# Patient Record
Sex: MALE | Race: WHITE | NOT HISPANIC OR LATINO | Employment: FULL TIME | URBAN - METROPOLITAN AREA
[De-identification: names, ages, dates, MRNs, and addresses within clinical notes are randomized per-mention and may not be internally consistent; named-entity substitution may affect disease eponyms.]

---

## 2023-05-02 ENCOUNTER — HOSPITAL ENCOUNTER (OUTPATIENT)
Dept: DATA CONVERSION | Facility: HOSPITAL | Age: 68
End: 2023-05-02
Attending: INTERNAL MEDICINE
Payer: MEDICARE

## 2023-05-02 DIAGNOSIS — K31.7 POLYP OF STOMACH AND DUODENUM: ICD-10-CM

## 2023-05-02 DIAGNOSIS — H42 GLAUCOMA IN DISEASES CLASSIFIED ELSEWHERE: ICD-10-CM

## 2023-05-02 DIAGNOSIS — R59.9 ENLARGED LYMPH NODES, UNSPECIFIED: ICD-10-CM

## 2023-05-02 DIAGNOSIS — G47.33 OBSTRUCTIVE SLEEP APNEA (ADULT) (PEDIATRIC): ICD-10-CM

## 2023-05-02 DIAGNOSIS — H40.9 UNSPECIFIED GLAUCOMA: ICD-10-CM

## 2023-05-02 DIAGNOSIS — E78.00 PURE HYPERCHOLESTEROLEMIA, UNSPECIFIED: ICD-10-CM

## 2023-05-02 DIAGNOSIS — K58.9 IRRITABLE BOWEL SYNDROME WITHOUT DIARRHEA: ICD-10-CM

## 2023-05-02 DIAGNOSIS — E11.39 TYPE 2 DIABETES MELLITUS WITH OTHER DIABETIC OPHTHALMIC COMPLICATION (MULTI): ICD-10-CM

## 2023-05-02 DIAGNOSIS — I10 ESSENTIAL (PRIMARY) HYPERTENSION: ICD-10-CM

## 2023-05-02 DIAGNOSIS — K21.9 GASTRO-ESOPHAGEAL REFLUX DISEASE WITHOUT ESOPHAGITIS: ICD-10-CM

## 2023-05-02 DIAGNOSIS — E66.9 OBESITY, UNSPECIFIED: ICD-10-CM

## 2023-05-02 LAB
POCT GLUCOSE: 115 MG/DL (ref 74–99)
POCT GLUCOSE: 118 MG/DL (ref 74–99)

## 2023-05-10 LAB
COMPLETE PATHOLOGY REPORT: NORMAL
CONVERTED CLINICAL DIAGNOSIS-HISTORY: NORMAL
CONVERTED FINAL DIAGNOSIS: NORMAL
CONVERTED FINAL REPORT PDF LINK TO COPY AND PASTE: NORMAL
CONVERTED GROSS DESCRIPTION: NORMAL

## 2024-03-05 ENCOUNTER — OFFICE VISIT (OUTPATIENT)
Dept: ORTHOPEDIC SURGERY | Facility: CLINIC | Age: 69
End: 2024-03-05
Payer: MEDICARE

## 2024-03-05 ENCOUNTER — HOSPITAL ENCOUNTER (OUTPATIENT)
Dept: RADIOLOGY | Facility: CLINIC | Age: 69
Discharge: HOME | End: 2024-03-05
Payer: MEDICARE

## 2024-03-05 DIAGNOSIS — M54.16 LUMBAR RADICULITIS: ICD-10-CM

## 2024-03-05 DIAGNOSIS — M54.16 LUMBAR RADICULITIS: Primary | ICD-10-CM

## 2024-03-05 PROCEDURE — 3008F BODY MASS INDEX DOCD: CPT | Performed by: STUDENT IN AN ORGANIZED HEALTH CARE EDUCATION/TRAINING PROGRAM

## 2024-03-05 PROCEDURE — 72120 X-RAY BEND ONLY L-S SPINE: CPT

## 2024-03-05 PROCEDURE — 99204 OFFICE O/P NEW MOD 45 MIN: CPT | Performed by: STUDENT IN AN ORGANIZED HEALTH CARE EDUCATION/TRAINING PROGRAM

## 2024-03-05 PROCEDURE — 72110 X-RAY EXAM L-2 SPINE 4/>VWS: CPT | Performed by: RADIOLOGY

## 2024-03-05 RX ORDER — TELMISARTAN 80 MG/1
80 TABLET ORAL
COMMUNITY
Start: 2024-01-03

## 2024-03-05 RX ORDER — MOMETASONE FUROATE 50 UG/1
SPRAY, METERED NASAL
COMMUNITY

## 2024-03-05 RX ORDER — GABAPENTIN 100 MG/1
100 CAPSULE ORAL NIGHTLY
Qty: 30 CAPSULE | Refills: 2 | Status: SHIPPED | OUTPATIENT
Start: 2024-03-05 | End: 2024-06-03

## 2024-03-05 RX ORDER — LANSOPRAZOLE 30 MG/1
30 CAPSULE, DELAYED RELEASE ORAL
COMMUNITY
Start: 2012-03-05 | End: 2024-07-01

## 2024-03-05 RX ORDER — INSULIN DETEMIR 100 [IU]/ML
10 INJECTION, SOLUTION SUBCUTANEOUS
COMMUNITY
Start: 2024-02-01 | End: 2024-05-01

## 2024-03-05 RX ORDER — TAMSULOSIN HYDROCHLORIDE 0.4 MG/1
0.4 CAPSULE ORAL 2 TIMES DAILY
COMMUNITY
Start: 2012-12-11

## 2024-03-05 RX ORDER — IBUPROFEN 200 MG
1 CAPSULE ORAL 3 TIMES DAILY
COMMUNITY
Start: 2023-07-03

## 2024-03-05 RX ORDER — ATORVASTATIN CALCIUM 10 MG/1
10 TABLET, FILM COATED ORAL
COMMUNITY
Start: 2024-01-03

## 2024-03-05 RX ORDER — FAMOTIDINE 20 MG/1
20 TABLET, FILM COATED ORAL 2 TIMES DAILY
COMMUNITY
Start: 2023-07-03

## 2024-03-05 RX ORDER — ERGOCALCIFEROL 1.25 MG/1
50000 CAPSULE ORAL WEEKLY
COMMUNITY
Start: 2024-01-09 | End: 2024-04-02

## 2024-03-05 RX ORDER — DICYCLOMINE HYDROCHLORIDE 20 MG/1
TABLET ORAL
COMMUNITY
Start: 2020-02-24

## 2024-03-05 RX ORDER — AZELASTINE 1 MG/ML
1 SPRAY, METERED NASAL 2 TIMES DAILY
COMMUNITY
Start: 2021-07-20

## 2024-03-05 RX ORDER — METHOCARBAMOL 500 MG/1
500 TABLET, FILM COATED ORAL EVERY 12 HOURS PRN
COMMUNITY
Start: 2024-02-08

## 2024-03-05 RX ORDER — NATEGLINIDE 120 MG/1
1 TABLET ORAL
COMMUNITY
Start: 2023-04-03

## 2024-03-05 RX ORDER — METOPROLOL SUCCINATE 100 MG/1
TABLET, EXTENDED RELEASE ORAL
COMMUNITY
Start: 2023-08-28

## 2024-03-05 RX ORDER — CALCIUM CITRATE/VITAMIN D3 200MG-6.25
TABLET ORAL
COMMUNITY

## 2024-03-05 RX ORDER — KETOCONAZOLE 20 MG/ML
SHAMPOO, SUSPENSION TOPICAL
COMMUNITY
Start: 2023-02-21

## 2024-03-05 SDOH — SOCIAL STABILITY: SOCIAL NETWORK: SOCIAL ACTIVITY:: 1

## 2024-03-05 NOTE — PROGRESS NOTES
Assessment     Cy is a 68 y.o. male with significant past medical history of KD (stage 3, last Cr 1.38), HTN, HLD, DM2 (last A1C 6.1 12/27/23), IBS/GERD, PAT, Glaucoma who presents with right leg and low back pain .  The patient's symptoms, clinical exam and imaging studies are suggestive of L5-S1 radiculopathy.  The other possible differential diagnosis(es) include(s):  disc herniation and spondylosis without myelopathy.      Plan     At this time, I would like to make the following recommendation/plan:  1.  Physical Therapy: referral placed with PT  2.  Medication: cont. With tylenol prn. Start gabapentin 100mg nightly (low dose 2/2 CKD)  3.  Injections: Not indicated at this time given low severity of pain; may consider if symptoms worsen post MRI completion  4.  Imaging: Xray of lumbar spine ordered.     Follow up:  Follow up in 6 weeks.    Subjective    Chief Complaint: right low back pain     History of Present Illness:  Cy Faria is a 68 y.o. male, retired, with pertinent PMH of CKD (stage 3, last Cr 1.38), HTN, HLD, DM2 (last A1C 6.1 12/27/23), IBS/GERD, PAT, Glaucoma presents today for low back and right leg to toes.  He pain first started on >10 years acutely worsening over last 6 month , without inciting event. The pain as located in the low back and left leg. Pain comes and goes lasting from minutes to hours)    Pain:        Severity:  Cy Faria states pain is: 4/10 at it's worst. On average pain is 2/10 (Scale 0-no pain, 10-worst pain)      Quality:  aching and throbbing.       Radiating: down the lateral leg       Aggravating Factors: no aggravating factors identified sitting on soft couch        Alleviating Factors:   tylenol  .      Time: sometimes morning      Associated symptoms:  no numbness, or tingling in the bilateral lower extremities; no balance problems or new bowel/bladder problems.    Physical Therapy/Occupational Therapy/Other Modalities:    - Chiropractor/OMT: No  -  Acupuncture: No  - Medical message: No    Last PT session: >2 years     Topicals:   ICE/Heat: hasn't tried   Topicals (Capsicin/Diclofenac gel/Salonpas/Lidocaine): voltaren helps a little    Medications:  Tylenol: once a day with mild relief  NSAIDs: can't taken because of CKD  Steroid: causes delirium   Gabapentin/Lyrica: Hasn't tried  Muscle relaxer: Has tried with robaxin without benefit  Duloxetine/Topamax/oxcarbazepine: hasn't tried        Current Outpatient Medications:     atorvastatin (Lipitor) 10 mg tablet, Take 1 tablet (10 mg) by mouth once daily., Disp: , Rfl:     azelastine (Astelin) 137 mcg (0.1 %) nasal spray, 1 spray by Does not apply route twice a day., Disp: , Rfl:     blood sugar diagnostic (Blood Glucose Test) strip, 1 strip 3 times a day., Disp: , Rfl:     dicyclomine (Bentyl) 20 mg tablet, Take 1/2 to 1 up to tid prn bowel spasms., Disp: , Rfl:     ergocalciferol (Vitamin D-2) 1.25 MG (13551 UT) capsule, Take 1 capsule (50,000 Units) by mouth once a week., Disp: , Rfl:     famotidine (Pepcid) 20 mg tablet, Take 1 tablet (20 mg) by mouth twice a day., Disp: , Rfl:     ketoconazole (NIZOral) 2 % shampoo, Wash affected areas daily in the shower, Disp: , Rfl:     lansoprazole (Prevacid) 30 mg DR capsule, Take 1 capsule (30 mg) by mouth once daily., Disp: , Rfl:     Levemir FlexPen 100 unit/mL (3 mL) pen, Inject 10 Units under the skin., Disp: , Rfl:     methocarbamol (Robaxin) 500 mg tablet, Take 1 tablet (500 mg) by mouth every 12 hours if needed., Disp: , Rfl:     metoprolol succinate XL (Toprol-XL) 100 mg 24 hr tablet, , Disp: , Rfl:     nateglinide (Starlix) 120 mg tablet, Take 1 tablet (120 mg) by mouth 3 times a day before meals., Disp: , Rfl:     tamsulosin (Flomax) 0.4 mg 24 hr capsule, Take 1 capsule (0.4 mg) by mouth twice a day., Disp: , Rfl:     telmisartan (MIcarDIS) 80 mg tablet, Take 1 tablet (80 mg) by mouth once daily., Disp: , Rfl:     liraglutide (Victoza) 0.6 mg/0.1 mL (18 mg/3  mL) injection, Inject 0.1 mL (0.6 mg) under the skin once daily., Disp: , Rfl:     mometasone (Nasonex) 50 mcg/actuation nasal spray, Use 2 Sprays in each nostril once daily. For patients (12yrs & older), Disp: , Rfl:     True Metrix Glucose Test Strip strip, 1 Strip three times daily. Use to test sugar 3 times daily., Disp: , Rfl:     Allergies   Allergen Reactions    Celebrex [Celecoxib] Shortness of breath       Injections:    Date/Injection Type/Location/%relief/ Lasting  No Hx  Surgery:  Date/Type/Location/%relief/ Lasting    Past Surgical History:   Procedure Laterality Date    OTHER SURGICAL HISTORY  11/10/2020    Gallbladder surgery       Past Medical History:   Diagnosis Date    Personal history of other diseases of the nervous system and sense organs     History of glaucoma         ROS:  - Sleep: Sleep isn't disrupted secondary to pain  - Bowel/Bladder: Denies new bowel/bladder dysfunction  - Falls: Denies fall  - Weight changes: Denies  - Mood/Psych: Denies any feelings of anxiety or depression    12-point review of systems was completed and is otherwise negative except as noted in the HPI.      Social Hx:  - Home: Lives alone in a house.   - ADLs: Independent in ADLs and ambulation without assistive device.   - Hobbies: golfing, walking   - Work:  retired    - Smoking/Alcohol/Drugs: No/No/No  Objective     Physical Exam:  General:  Appears state age, in NAD, and well nourished  Psychological:  Normal mood and affect  Pulm:  Breathing comfortably on RA    Gait:   - Antalgic  - Without assistive device  - able to heel raise, able to toe walk    Inspection:   - No erythema, swelling/edema, ecchymosis or deformity noted  - normal muscle bulk of bilateral lower extremity     Sensation:  - Intact to light touch in bilateral lower extremity    Palpation:  - No tenderness to palpation of bilateral greater trochanter  - No tenderness to palpation of bilateral sacroiliac joint  - No tenderness to  palpation of bilateral spinal paraspinals at the level of L4-L5  - No tenderness to palpation of spinous process at the level of L4-L5    Range of Motion:  - limited mildly painful thoracolumbar flexion/extension/rotation/sidebending  - limited painless bilateral hip flexion/internal rotation/external rotation (very tight hamstring)    Strength:  Side Hip Flexion (L2) Knee Extension (L3) Ankle Dorsiflexion (L4) Great Toe extension (L5) Ankle Plantarflexion  (S1) Hip Adduction  (L2-L4) Hip Abduction  (L5-S1)   Right 5 5 NT NT NT NT NT   Left 5 5 Nt Nt Nt Nt NT     Reflexes:  Side Patellar (L4) Achilles (S1)  Medial hamstring(L5)   Right 2+ 2+ NT   Left 2+ 2+ NT     Special tests:  - Disc/Nerve root: (SLR): neg b/l  - Facet loading: neg b/l    Imaging and Other Studies:    No results found.

## 2024-03-13 ENCOUNTER — APPOINTMENT (OUTPATIENT)
Dept: GASTROENTEROLOGY | Facility: CLINIC | Age: 69
End: 2024-03-13
Payer: MEDICARE

## 2024-03-21 ENCOUNTER — APPOINTMENT (OUTPATIENT)
Dept: GASTROENTEROLOGY | Facility: CLINIC | Age: 69
End: 2024-03-21
Payer: MEDICARE

## 2024-04-16 ENCOUNTER — OFFICE VISIT (OUTPATIENT)
Dept: ORTHOPEDIC SURGERY | Facility: CLINIC | Age: 69
End: 2024-04-16
Payer: MEDICARE

## 2024-04-16 DIAGNOSIS — M54.16 LUMBAR RADICULITIS: Primary | ICD-10-CM

## 2024-04-16 PROCEDURE — 99213 OFFICE O/P EST LOW 20 MIN: CPT | Performed by: STUDENT IN AN ORGANIZED HEALTH CARE EDUCATION/TRAINING PROGRAM

## 2024-04-16 PROCEDURE — 3008F BODY MASS INDEX DOCD: CPT | Performed by: STUDENT IN AN ORGANIZED HEALTH CARE EDUCATION/TRAINING PROGRAM

## 2024-04-16 PROCEDURE — 1125F AMNT PAIN NOTED PAIN PRSNT: CPT | Performed by: STUDENT IN AN ORGANIZED HEALTH CARE EDUCATION/TRAINING PROGRAM

## 2024-04-16 SDOH — SOCIAL STABILITY: SOCIAL NETWORK: SOCIAL ACTIVITY:: 1

## 2024-04-16 ASSESSMENT — PAIN - FUNCTIONAL ASSESSMENT: PAIN_FUNCTIONAL_ASSESSMENT: 0-10

## 2024-04-16 ASSESSMENT — PAIN DESCRIPTION - DESCRIPTORS: DESCRIPTORS: ACHING

## 2024-04-16 ASSESSMENT — PAIN SCALES - GENERAL: PAINLEVEL_OUTOF10: 2

## 2024-04-16 NOTE — PROGRESS NOTES
Assessment     Cy is a 68 y.o. male with significant past medical history of CKD (stage 3, last Cr 1.38), HTN, HLD, DM2 (last A1C 6.1 12/27/23), IBS/GERD, PAT, Glaucoma who presents with right leg and low back pain .  The patient's symptoms, clinical exam and imaging studies are suggestive of L5-S1 radiculopathy.  The other possible differential diagnosis(es) include(s):  disc herniation and spondylosis without myelopathy.         His symptoms have improved since the last visit with 25% improvement.  Physical Therapy and medication (gabapentin not as prescribed)helped significantly.    Plan     At this time, I would like to make the following recommendation/plan:  1.  Physical Therapy: continue with PT   2.  Medication: : cont. With tylenol prn. C/w gabapentin 100mg nightly (low dose 2/2 CKD)   3.  Injections: .Not indicated at this time given low severity of pain; may consider if symptoms worsen post MRI completion   4.  Imaging: Interpreted:   Xray of lumbar spine from 03/05/24 interpreted  as:  Evidence of retrolisthesis of L5 on S1  Multilevel Facet arthropathy at the level L4-s1    Follow up:  Follow up in 2 months       Subjective    Chief Complaint: right low back pain     HPI:  Cy Faria is a 68 y.o. male, retired, with pertinent PMH of CKD (stage 3, last Cr 1.38), HTN, HLD, DM2 (last A1C 6.1 12/27/23), IBS/GERD, PAT, Glaucoma who is following up today for low back and right leg to toes.  He pain first started on >10 years acutely worsening over last 6 months, without inciting event.  He was last seen on 03/05/24. Plan at the time was PT referral and start gabapentin.  Since his last visit symptoms have improved by 25% since onset. Today, pain is located in the low back and left leg. Pain comes and goes lasting from minutes to hours), described as  aching and throbbing radiating down the lateral leg, 2/10.    Since last visit:  - Physical Therapy: Last PT was yesterday. Patient has completed 4  sessions with moderate benefit.   - Medication: Tried gabapentin 100 mg  per day, tolerated with mild sedative side effect and mild benefit.  - tylenol, voltaren help a little. Steroids cause delirium. Robaxin doesn't help  - Imaging:  xray of lumbar spine was completed    ROS:  - Sleep: Sleep isn't disrupted secondary to pain  - Bowel/Bladder: Denies new bowel/bladder dysfunction  - Falls: Denies fall  - Weight changes: Denies  - Mood/Psych: Denies any feelings of anxiety or depression    12-point review of systems was completed and is otherwise negative except as noted in the HPI.      Social Hx:  - Home: Lives alone in a house.   - ADLs: Independent in ADLs and ambulation without assistive device.   - Hobbies: golfing, walking   - Work:  retired    - Smoking/Alcohol/Drugs: No/No/No    Objective     Physical Exam  General:  Appears state age, in NAD, and well nourished  Psychological:  Normal mood and affect  Pulm:  Breathing comfortably on RA    Gait:   - Antalgic  - Without assistive device  - able to heel raise, able to toe raise     Inspection:   - No erythema, swelling/edema, ecchymosis or deformity noted  - normal muscle bulk of bilateral lower extremity      Sensation:  - Intact to light touch in bilateral lower extremity     Palpation:  - No tenderness to palpation of bilateral greater trochanter  - No tenderness to palpation of bilateral sacroiliac joint  - No tenderness to palpation of bilateral spinal paraspinals at the level of L4-L5  - No tenderness to palpation of spinous process at the level of L4-L5     Range of Motion:  - limited painful thoracolumbar flexion/extension  - limited painless bilateral hip flexion/internal rotation/external rotation (very tight hamstring)     Strength:  Side Hip Flexion (L2) Knee Extension (L3) Ankle Dorsiflexion (L4) Great Toe extension (L5) Ankle Plantarflexion  (S1) Hip Adduction  (L2-L4) Hip Abduction  (L5-S1)   Right 5 5 NT NT NT NT NT   Left 5 5 Nt Nt  Nt Nt NT      Reflexes:  Side Patellar (L4) Achilles (S1)  Medial hamstring(L5)   Right 2+ 2+ NT   Left 2+ 2+ NT      Special tests:  - Disc/Nerve root: (SLR): neg b/l  - Facet loading: neg b/l    Imaging and Other Studies:    XR LUMBAR SPINE 4+ VIEWS WITH FLEXION EXTENSION; ;  3/5/2024 3:39 pm      INDICATION:  Signs/Symptoms:chronic low back pain.      COMPARISON:  None.      ACCESSION NUMBER(S):  [id number]      ORDERING CLINICIAN:  BRANDON QUILES      FINDINGS:  Lumbar spine, four views      There is moderate multilevel disc space narrowing osteophytosis  throughout the lumbar spine worse at L3-L4. Moderate facet disease  lower lumbar spine. There is no fracture. No spondylolisthesis. There  is no change on flexion-extension views      IMPRESSION:  Moderate multilevel spondylosis worse at L3-L4. Moderate facet  disease. No acute abnormality

## 2024-04-23 RX ORDER — SODIUM CHLORIDE, SODIUM LACTATE, POTASSIUM CHLORIDE, CALCIUM CHLORIDE 600; 310; 30; 20 MG/100ML; MG/100ML; MG/100ML; MG/100ML
20 INJECTION, SOLUTION INTRAVENOUS CONTINUOUS
Status: CANCELLED | OUTPATIENT
Start: 2024-04-23

## 2024-04-24 ENCOUNTER — HOSPITAL ENCOUNTER (OUTPATIENT)
Dept: GASTROENTEROLOGY | Facility: HOSPITAL | Age: 69
Discharge: HOME | End: 2024-04-24
Payer: MEDICARE

## 2024-04-24 ENCOUNTER — ANESTHESIA (OUTPATIENT)
Dept: GASTROENTEROLOGY | Facility: HOSPITAL | Age: 69
End: 2024-04-24
Payer: MEDICARE

## 2024-04-24 ENCOUNTER — ANESTHESIA EVENT (OUTPATIENT)
Dept: GASTROENTEROLOGY | Facility: HOSPITAL | Age: 69
End: 2024-04-24
Payer: MEDICARE

## 2024-04-24 VITALS
TEMPERATURE: 97.7 F | WEIGHT: 266.32 LBS | HEIGHT: 68 IN | HEART RATE: 93 BPM | DIASTOLIC BLOOD PRESSURE: 85 MMHG | SYSTOLIC BLOOD PRESSURE: 151 MMHG | RESPIRATION RATE: 16 BRPM | OXYGEN SATURATION: 96 % | BODY MASS INDEX: 40.36 KG/M2

## 2024-04-24 DIAGNOSIS — K31.7 POLYP OF STOMACH AND DUODENUM: ICD-10-CM

## 2024-04-24 PROBLEM — G47.33 OSA (OBSTRUCTIVE SLEEP APNEA): Status: ACTIVE | Noted: 2024-04-24

## 2024-04-24 PROBLEM — E78.5 HYPERLIPIDEMIA: Status: ACTIVE | Noted: 2024-04-24

## 2024-04-24 PROBLEM — E11.9 DIABETES MELLITUS, TYPE 2 (MULTI): Status: ACTIVE | Noted: 2024-04-24

## 2024-04-24 PROBLEM — N18.9 CHRONIC RENAL INSUFFICIENCY: Status: ACTIVE | Noted: 2024-04-24

## 2024-04-24 PROBLEM — I10 HTN (HYPERTENSION): Status: ACTIVE | Noted: 2024-04-24

## 2024-04-24 PROBLEM — K21.9 GASTROESOPHAGEAL REFLUX DISEASE: Status: ACTIVE | Noted: 2024-04-24

## 2024-04-24 PROBLEM — H40.9 GLAUCOMA: Status: ACTIVE | Noted: 2024-04-24

## 2024-04-24 PROBLEM — E66.9 OBESITY: Status: ACTIVE | Noted: 2024-04-24

## 2024-04-24 LAB
GLUCOSE BLD MANUAL STRIP-MCNC: 106 MG/DL (ref 74–99)
GLUCOSE BLD MANUAL STRIP-MCNC: 118 MG/DL (ref 74–99)

## 2024-04-24 PROCEDURE — 88305 TISSUE EXAM BY PATHOLOGIST: CPT | Mod: 59 | Performed by: INTERNAL MEDICINE

## 2024-04-24 PROCEDURE — 2500000005 HC RX 250 GENERAL PHARMACY W/O HCPCS: Performed by: ANESTHESIOLOGIST ASSISTANT

## 2024-04-24 PROCEDURE — 88305 TISSUE EXAM BY PATHOLOGIST: CPT | Performed by: PATHOLOGY

## 2024-04-24 PROCEDURE — 43251 EGD REMOVE LESION SNARE: CPT | Performed by: INTERNAL MEDICINE

## 2024-04-24 PROCEDURE — A43251 PR EDG REMOVAL TUMOR POLYP/OTHER LESION SNARE TECH: Performed by: ANESTHESIOLOGIST ASSISTANT

## 2024-04-24 PROCEDURE — 2720000007 HC OR 272 NO HCPCS

## 2024-04-24 PROCEDURE — 2500000004 HC RX 250 GENERAL PHARMACY W/ HCPCS (ALT 636 FOR OP/ED): Performed by: ANESTHESIOLOGIST ASSISTANT

## 2024-04-24 PROCEDURE — 3700000001 HC GENERAL ANESTHESIA TIME - INITIAL BASE CHARGE

## 2024-04-24 PROCEDURE — 82947 ASSAY GLUCOSE BLOOD QUANT: CPT | Mod: 91

## 2024-04-24 PROCEDURE — 3700000002 HC GENERAL ANESTHESIA TIME - EACH INCREMENTAL 1 MINUTE

## 2024-04-24 PROCEDURE — 7100000010 HC PHASE TWO TIME - EACH INCREMENTAL 1 MINUTE

## 2024-04-24 PROCEDURE — 7100000009 HC PHASE TWO TIME - INITIAL BASE CHARGE

## 2024-04-24 PROCEDURE — A43251 PR EDG REMOVAL TUMOR POLYP/OTHER LESION SNARE TECH: Performed by: ANESTHESIOLOGY

## 2024-04-24 PROCEDURE — 43239 EGD BIOPSY SINGLE/MULTIPLE: CPT | Mod: XS | Performed by: INTERNAL MEDICINE

## 2024-04-24 RX ORDER — SODIUM CHLORIDE, SODIUM LACTATE, POTASSIUM CHLORIDE, CALCIUM CHLORIDE 600; 310; 30; 20 MG/100ML; MG/100ML; MG/100ML; MG/100ML
100 INJECTION, SOLUTION INTRAVENOUS CONTINUOUS
Status: DISCONTINUED | OUTPATIENT
Start: 2024-04-24 | End: 2024-04-25 | Stop reason: HOSPADM

## 2024-04-24 RX ORDER — MIDAZOLAM HYDROCHLORIDE 1 MG/ML
INJECTION INTRAMUSCULAR; INTRAVENOUS AS NEEDED
Status: DISCONTINUED | OUTPATIENT
Start: 2024-04-24 | End: 2024-04-24

## 2024-04-24 RX ORDER — ONDANSETRON HYDROCHLORIDE 2 MG/ML
4 INJECTION, SOLUTION INTRAVENOUS ONCE AS NEEDED
Status: DISCONTINUED | OUTPATIENT
Start: 2024-04-24 | End: 2024-04-25 | Stop reason: HOSPADM

## 2024-04-24 RX ORDER — PROPOFOL 10 MG/ML
INJECTION, EMULSION INTRAVENOUS CONTINUOUS PRN
Status: DISCONTINUED | OUTPATIENT
Start: 2024-04-24 | End: 2024-04-24

## 2024-04-24 RX ORDER — FENTANYL CITRATE 50 UG/ML
INJECTION, SOLUTION INTRAMUSCULAR; INTRAVENOUS CONTINUOUS PRN
Status: DISCONTINUED | OUTPATIENT
Start: 2024-04-24 | End: 2024-04-24

## 2024-04-24 RX ADMIN — GLYCOPYRROLATE 0.2 MCG: 0.2 INJECTION, SOLUTION INTRAMUSCULAR; INTRAVITREAL at 08:36

## 2024-04-24 RX ADMIN — MIDAZOLAM HYDROCHLORIDE 2 MG: 1 INJECTION INTRAMUSCULAR; INTRAVENOUS at 08:36

## 2024-04-24 RX ADMIN — PROPOFOL 400 MCG/KG/MIN: 10 INJECTION, EMULSION INTRAVENOUS at 08:42

## 2024-04-24 SDOH — HEALTH STABILITY: MENTAL HEALTH: CURRENT SMOKER: 0

## 2024-04-24 ASSESSMENT — PAIN - FUNCTIONAL ASSESSMENT
PAIN_FUNCTIONAL_ASSESSMENT: 0-10

## 2024-04-24 ASSESSMENT — PAIN SCALES - GENERAL
PAINLEVEL_OUTOF10: 0 - NO PAIN

## 2024-04-24 ASSESSMENT — COLUMBIA-SUICIDE SEVERITY RATING SCALE - C-SSRS
2. HAVE YOU ACTUALLY HAD ANY THOUGHTS OF KILLING YOURSELF?: NO
6. HAVE YOU EVER DONE ANYTHING, STARTED TO DO ANYTHING, OR PREPARED TO DO ANYTHING TO END YOUR LIFE?: NO
1. IN THE PAST MONTH, HAVE YOU WISHED YOU WERE DEAD OR WISHED YOU COULD GO TO SLEEP AND NOT WAKE UP?: NO

## 2024-04-24 NOTE — ANESTHESIA POSTPROCEDURE EVALUATION
Patient: Cy Faria    Procedure Summary       Date: 04/24/24 Room / Location: [facility]    Anesthesia Start: 0840 Anesthesia Stop: 0903    Procedure: EGD Diagnosis: Polyp of stomach and duodenum    Scheduled Providers: Dayan Lilly MD; Bradly Rodriguez DO; NISA Traore Responsible Provider: Bradly Rodriguez DO    Anesthesia Type: MAC ASA Status: 3            Anesthesia Type: MAC    Vitals Value Taken Time   /83 04/24/24 0931   Temp 36.5 °C (97.7 °F) 04/24/24 0859   Pulse 86 04/24/24 0932   Resp 16 04/24/24 0914   SpO2 96 % 04/24/24 0932   Vitals shown include unfiled device data.    Anesthesia Post Evaluation    Patient location during evaluation: PACU  Patient participation: complete - patient participated  Level of consciousness: awake and alert  Pain management: adequate  Airway patency: patent  Cardiovascular status: acceptable and blood pressure returned to baseline  Respiratory status: acceptable  Hydration status: acceptable  Postoperative Nausea and Vomiting: none        No notable events documented.

## 2024-04-24 NOTE — ANESTHESIA PREPROCEDURE EVALUATION
Patient: Cy Faria    Procedure Information       Date/Time: 04/24/24 0830    Scheduled providers: Dayan Lilly MD; Bradly Rodriguez DO; NISA Traore    Procedure: EGD    Location: [facility]note 3[facility]            Relevant Problems   Anesthesia (within normal limits)      Cardiac   (+) HTN (hypertension)   (+) Hyperlipidemia      Pulmonary   (+) PAT (obstructive sleep apnea)      Neuro (within normal limits)      GI  Gastric polyp   (+) Gastroesophageal reflux disease      /Renal   (+) Chronic renal insufficiency      Liver (within normal limits)      Endocrine   (+) Diabetes mellitus, type 2 (Multi)   (+) Obesity      HEENT   (+) Glaucoma     Vitals:    04/24/24 0734   BP: 169/88   Pulse: 85   Resp: 12   Temp: 36.2 °C (97.2 °F)   SpO2: 95%       Past Surgical History:   Procedure Laterality Date    COLONOSCOPY      CYST REMOVAL Left     OTHER SURGICAL HISTORY  11/10/2020    Gallbladder surgery    TONSILLECTOMY       Past Medical History:   Diagnosis Date    DM (diabetes mellitus) (Multi)     Enlarged prostate     GERD (gastroesophageal reflux disease)     Personal history of other diseases of the nervous system and sense organs     History of glaucoma    Sleep apnea        Current Outpatient Medications:     atorvastatin (Lipitor) 10 mg tablet, Take 1 tablet (10 mg) by mouth once daily., Disp: , Rfl:     azelastine (Astelin) 137 mcg (0.1 %) nasal spray, 1 spray by Does not apply route twice a day., Disp: , Rfl:     dicyclomine (Bentyl) 20 mg tablet, Take 1/2 to 1 up to tid prn bowel spasms., Disp: , Rfl:     famotidine (Pepcid) 20 mg tablet, Take 1 tablet (20 mg) by mouth twice a day., Disp: , Rfl:     gabapentin (Neurontin) 100 mg capsule, Take 1 capsule (100 mg) by mouth once daily at bedtime., Disp: 30 capsule, Rfl: 2    ketoconazole (NIZOral) 2 % shampoo, Wash affected areas daily in the shower, Disp: , Rfl:     lansoprazole (Prevacid) 30 mg DR capsule, Take 1 capsule (30 mg) by mouth  once daily., Disp: , Rfl:     Levemir FlexPen 100 unit/mL (3 mL) pen, Inject 10 Units under the skin., Disp: , Rfl:     liraglutide (Victoza) 0.6 mg/0.1 mL (18 mg/3 mL) injection, Inject 0.1 mL (0.6 mg) under the skin once daily., Disp: , Rfl:     methocarbamol (Robaxin) 500 mg tablet, Take 1 tablet (500 mg) by mouth every 12 hours if needed., Disp: , Rfl:     metoprolol succinate XL (Toprol-XL) 100 mg 24 hr tablet, , Disp: , Rfl:     mometasone (Nasonex) 50 mcg/actuation nasal spray, Use 2 Sprays in each nostril once daily. For patients (12yrs & older), Disp: , Rfl:     nateglinide (Starlix) 120 mg tablet, Take 1 tablet (120 mg) by mouth 3 times a day before meals., Disp: , Rfl:     tamsulosin (Flomax) 0.4 mg 24 hr capsule, Take 1 capsule (0.4 mg) by mouth twice a day., Disp: , Rfl:     telmisartan (MIcarDIS) 80 mg tablet, Take 1 tablet (80 mg) by mouth once daily., Disp: , Rfl:     blood sugar diagnostic (Blood Glucose Test) strip, 1 strip 3 times a day., Disp: , Rfl:     True Metrix Glucose Test Strip strip, 1 Strip three times daily. Use to test sugar 3 times daily., Disp: , Rfl:     Current Facility-Administered Medications:     lactated Ringer's infusion, 100 mL/hr, intravenous, Continuous, Bradly Rodriguez,     ondansetron (Zofran) injection 4 mg, 4 mg, intravenous, Once PRN, Bradly Rodriguez,     oxygen (O2) therapy, , inhalation, Continuous - 02/gases, Bradly Rodriguez, DO  Prior to Admission medications    Medication Sig Start Date End Date Taking? Authorizing Provider   atorvastatin (Lipitor) 10 mg tablet Take 1 tablet (10 mg) by mouth once daily. 1/3/24  Yes Historical Provider, MD   azelastine (Astelin) 137 mcg (0.1 %) nasal spray 1 spray by Does not apply route twice a day. 7/20/21  Yes Historical Provider, MD   dicyclomine (Bentyl) 20 mg tablet Take 1/2 to 1 up to tid prn bowel spasms. 2/24/20  Yes Historical Provider, MD   famotidine (Pepcid) 20 mg tablet Take 1 tablet (20 mg) by mouth  twice a day. 7/3/23  Yes Historical Provider, MD   gabapentin (Neurontin) 100 mg capsule Take 1 capsule (100 mg) by mouth once daily at bedtime. 3/5/24 6/3/24 Yes Collette Lomas,    ketoconazole (NIZOral) 2 % shampoo Wash affected areas daily in the shower 2/21/23  Yes Historical Provider, MD   lansoprazole (Prevacid) 30 mg DR capsule Take 1 capsule (30 mg) by mouth once daily. 3/5/12 7/1/24 Yes Historical Provider, MD   Levemir FlexPen 100 unit/mL (3 mL) pen Inject 10 Units under the skin. 2/1/24 5/1/24 Yes Historical Provider, MD   liraglutide (Victoza) 0.6 mg/0.1 mL (18 mg/3 mL) injection Inject 0.1 mL (0.6 mg) under the skin once daily.   Yes Historical Provider, MD   methocarbamol (Robaxin) 500 mg tablet Take 1 tablet (500 mg) by mouth every 12 hours if needed. 2/8/24  Yes Historical Provider, MD   metoprolol succinate XL (Toprol-XL) 100 mg 24 hr tablet  8/28/23  Yes Historical Provider, MD   mometasone (Nasonex) 50 mcg/actuation nasal spray Use 2 Sprays in each nostril once daily. For patients (12yrs & older)   Yes Historical Provider, MD   nateglinide (Starlix) 120 mg tablet Take 1 tablet (120 mg) by mouth 3 times a day before meals. 4/3/23  Yes Historical Provider, MD   tamsulosin (Flomax) 0.4 mg 24 hr capsule Take 1 capsule (0.4 mg) by mouth twice a day. 12/11/12  Yes Historical Provider, MD   telmisartan (MIcarDIS) 80 mg tablet Take 1 tablet (80 mg) by mouth once daily. 1/3/24  Yes Historical Provider, MD   blood sugar diagnostic (Blood Glucose Test) strip 1 strip 3 times a day. 7/3/23   Historical Provider, MD   True Metrix Glucose Test Strip strip 1 Strip three times daily. Use to test sugar 3 times daily.    Historical Provider, MD     Allergies   Allergen Reactions    Celebrex [Celecoxib] Shortness of breath    Augmentin [Amoxicillin-Pot Clavulanate] Unknown     Stomach cramps     Social History     Tobacco Use    Smoking status: Never    Smokeless tobacco: Never   Substance Use Topics    Alcohol use:  "Not Currently         Chemistry    No results found for: \"NA\", \"K\", \"CL\", \"CO2\", \"BUN\", \"CREATININE\", \"GLU\" No results found for: \"CALCIUM\", \"ALKPHOS\", \"AST\", \"ALT\", \"BILITOT\"         Clinical information reviewed:   Tobacco  Allergies  Meds   Med Hx  Surg Hx   Fam Hx  Soc Hx        NPO Detail:  NPO/Void Status  Date of Last Liquid: 04/24/24  Time of Last Liquid: 0445  Date of Last Solid: 04/23/24  Time of Last Solid: 1900  Last Intake Type: Clear fluids  Time of Last Void: 0731         Physical Exam    Airway  Mallampati: III  TM distance: >3 FB  Neck ROM: full     Cardiovascular   Rhythm: regular     Dental   Comments: Denies   Pulmonary   Breath sounds clear to auscultation     Abdominal            Anesthesia Plan    History of general anesthesia?: yes  History of complications of general anesthesia?: no    ASA 3     MAC     The patient is not a current smoker.    Anesthetic plan and risks discussed with patient.  Use of blood products discussed with patient who consented to blood products.    Plan discussed with [facility].      "

## 2024-04-24 NOTE — DISCHARGE INSTRUCTIONS
Patient Instructions after an endoscopy      The anesthetics, sedatives or narcotics which were given to you today will be acting in your body for the next 24 hours, so you might feel a little sleepy or groggy.  This feeling should slowly wear off. Carefully read and follow the instructions.     You received sedation today:  - Do not drive or operate any machinery or power tools of any kind.   - No alcoholic beverages today, not even beer or wine.  - Do not make any important decisions or sign any legal documents.  - No over the counter medications that contain alcohol or that may cause drowsiness.  - Do not make any important decisions or sign any legal documents.    While it is common to experience mild to moderate abdominal distention, gas, or belching after your procedure, if any of these symptoms occur following discharge from the GI Lab or within one week of having your procedure, call the [facility] to be advised whether a visit to your nearest Urgent Care or Emergency Department is indicated.  Take this paper with you if you go.     - If you develop an allergic reaction to the medications that were given during your procedure such as difficulty breathing, rash, hives, severe nausea, vomiting or lightheadedness.- If you experience chest pain, shortness of breath, severe abdominal pain, fevers and chills.    -If you develop signs and symptoms of bleeding such as blood in your spit, if your stools turn black, tarry, or bloody    - If you have not urinated within 8 hours following your procedure.- If your IV site becomes painful, red, inflamed, or looks infected.

## 2024-04-25 ASSESSMENT — PAIN SCALES - GENERAL: PAINLEVEL_OUTOF10: 0 - NO PAIN

## 2024-04-30 ENCOUNTER — OFFICE VISIT (OUTPATIENT)
Dept: GASTROENTEROLOGY | Facility: CLINIC | Age: 69
End: 2024-04-30
Payer: MEDICARE

## 2024-04-30 DIAGNOSIS — Z86.010 HISTORY OF ADENOMATOUS POLYP OF COLON: Primary | ICD-10-CM

## 2024-04-30 DIAGNOSIS — K31.7 GASTRIC POLYPS: ICD-10-CM

## 2024-04-30 DIAGNOSIS — K58.0 IRRITABLE BOWEL SYNDROME WITH DIARRHEA: ICD-10-CM

## 2024-04-30 DIAGNOSIS — K21.9 GASTROESOPHAGEAL REFLUX DISEASE WITHOUT ESOPHAGITIS: ICD-10-CM

## 2024-04-30 LAB
LABORATORY COMMENT REPORT: NORMAL
PATH REPORT.FINAL DX SPEC: NORMAL
PATH REPORT.GROSS SPEC: NORMAL
PATH REPORT.MICROSCOPIC SPEC OTHER STN: NORMAL
PATH REPORT.RELEVANT HX SPEC: NORMAL
PATH REPORT.TOTAL CANCER: NORMAL

## 2024-04-30 PROCEDURE — 3008F BODY MASS INDEX DOCD: CPT | Performed by: INTERNAL MEDICINE

## 2024-04-30 PROCEDURE — 99214 OFFICE O/P EST MOD 30 MIN: CPT | Performed by: INTERNAL MEDICINE

## 2024-04-30 PROCEDURE — 4010F ACE/ARB THERAPY RXD/TAKEN: CPT | Performed by: INTERNAL MEDICINE

## 2024-04-30 PROCEDURE — 1159F MED LIST DOCD IN RCRD: CPT | Performed by: INTERNAL MEDICINE

## 2024-04-30 RX ORDER — SODIUM, POTASSIUM,MAG SULFATES 17.5-3.13G
1 SOLUTION, RECONSTITUTED, ORAL ORAL ONCE
Qty: 1 EACH | Refills: 0 | Status: SHIPPED | OUTPATIENT
Start: 2024-04-30 | End: 2024-04-30

## 2024-04-30 ASSESSMENT — ENCOUNTER SYMPTOMS: SHORTNESS OF BREATH: 0

## 2024-04-30 NOTE — PROGRESS NOTES
REASON FOR VISIT:  Gastric and colon polyps, IBS  PCP (requesting provider): Gerardo Castillo DO.    HPI:  Cy Faria is a 68 y.o. male with a past medical history of HTN, HLD, and DM being evaluate for adenomatous colon polyps, recurrent large inflammatory hyperplastic gastric polyps, hepatic steatosis, and IBS. Fecal pancreatic elastase normal (1/2018). MRI/MRCP showed mild prominence of CBD related to prior cholecystectomy, small hepatic cysts, and fatty liver (1/2021). CT A/P w/ contrast showed mildly prominent periportal lymph nodes similar to prior (1/2023, [facility]). EGD/EUS with single 8 mm gastric prolapse polyp resected and single 1.5 cm benign lymph node at the reggie hepatis (5/2023). GERD well-controlled on Lansoprazole. IBS symptoms improved on Dicyclomine, Metamucil, and intermittent courses of Rifaximin.      The patient just had EGD with Dr. Lilly on her last day. Recommend repeat EGD in 1 year. He notres occasional episode of diarrhea. This happens 1-2 times per week. He uses Dicyclomine as needed a few times per week. He takes Lansoprazole and Famotidine and this is under good control. No regular NSAIDs. He takes Tylenol. No blood in the stool.    PSurgHx:  -Cholecystectomy      FamHx: No esophagus, stomach, or colon cancer      SocHx: Denies smoking, rare alcohol, and denies illicits      Prior Endoscopy:  -EGD (4/2024): Normal esophagus, inflammatory appearing gastric antral polyp removed (partially eroded HP, no dysplasia), 2 mm duodenal polyp removed (isolated lymphangioma, no dysplasia).  -EGD/EUS (5/2023): Normal esophagus, medium retained food in the stomach, gastric erythema, single 8 mm inflammatory gastric polyp (mucosal prolapse polyp), diffuse pancreatic echogenicity and hyperechoic foci, PD with few prominent side branches, single benign 1.5 cm lymph node at the reggie hepatis.  -EGD (11/2022): Normal esophagus, mild gastric erythema (H. pylori -), inflammatory appearing 1 cm  pedunculated gastric polyp resected (HP), normal duodenum.  -EGD (11/2021): Normal esophagus, multiple small fundic gland polyps in body and fundus, single 1.5 cm gastric antral polyp resected (HP with ulceration and no dysplasia), normal duodenum.   -Colonoscopy (10/2021): Good prep, 6 mm cecal polyp (TA), 5 mm descending polyp (TA), 7 mm sigmoid polyps (lymphoid aggregate), mild sigmoid diverticulosis, medium sized IH/EH, otherwise normal colon.   -EGD (10/2021): Normal esophagus, multiple small to medium sized fundic gland polyps in body and fundus (fundic gland polyps), single 2.5 cm gastric antral polyp (HP with ulceration), normal duodenum.   -EGD (10/2020): Gastritis and gastric polyps.  -Colonoscopy (10/2020): Significant stool, tubular adenoma and sessile serrated adenoma colon polyps, small IH, otherwise normal colon.     PAST MEDICAL HISTORY  Past Medical History:   Diagnosis Date    DM (diabetes mellitus) (Multi)     Enlarged prostate     GERD (gastroesophageal reflux disease)     Personal history of other diseases of the nervous system and sense organs     History of glaucoma    Sleep apnea        PAST SURGICAL HISTORY  Past Surgical History:   Procedure Laterality Date    COLONOSCOPY      CYST REMOVAL Left     OTHER SURGICAL HISTORY  11/10/2020    Gallbladder surgery    TONSILLECTOMY         FAMILY HISTORY  No family history on file.    SOCIAL HISTORY   reports that he has never smoked. He has never used smokeless tobacco. He reports that he does not currently use alcohol. He reports that he does not currently use drugs.    REVIEW OF SYSTEMS  Review of Systems   Respiratory:  Negative for shortness of breath.    Cardiovascular:  Negative for chest pain.   All other systems reviewed and are negative.    A 10+ point review of systems was otherwise negative except as noted and per HPI.    ALLERGIES  Allergies   Allergen Reactions    Celebrex [Celecoxib] Shortness of breath    Augmentin [Amoxicillin-Pot  "Clavulanate] Unknown     Stomach cramps       MEDICATIONS  Current Outpatient Medications   Medication Instructions    atorvastatin (LIPITOR) 10 mg, oral, Daily RT    azelastine (Astelin) 137 mcg (0.1 %) nasal spray 1 spray, Does not apply, 2 times daily    blood sugar diagnostic (Blood Glucose Test) strip 1 strip, miscellaneous, 3 times daily    dicyclomine (Bentyl) 20 mg tablet Take 1/2 to 1 up to tid prn bowel spasms.    famotidine (PEPCID) 20 mg, oral, 2 times daily    gabapentin (NEURONTIN) 100 mg, oral, Nightly    ketoconazole (NIZOral) 2 % shampoo Wash affected areas daily in the shower    lansoprazole (PREVACID) 30 mg, oral, Daily RT    Levemir FlexPen 10 Units, subcutaneous    liraglutide (VICTOZA) 0.6 mg, subcutaneous, Daily RT    methocarbamol (ROBAXIN) 500 mg, oral, Every 12 hours PRN    metoprolol succinate XL (Toprol-XL) 100 mg 24 hr tablet     mometasone (Nasonex) 50 mcg/actuation nasal spray Use 2 Sprays in each nostril once daily. For patients (12yrs & older)    nateglinide (Starlix) 120 mg tablet 1 tablet, oral, 3 times daily before meals    tamsulosin (FLOMAX) 0.4 mg, oral, 2 times daily    telmisartan (MICARDIS) 80 mg, oral, Daily RT    True Metrix Glucose Test Strip strip 1 Strip three times daily. Use to test sugar 3 times daily.       VITALS  There were no vitals filed for this visit.   There is no height or weight on file to calculate BMI.    PHYSICAL EXAM  CONSTITUTIONAL: NAD, appears stated age  EYES: anicteric sclera, sclera clear  HEAD: normocephalic, atraumatic   NECK: supple   PULMONARY: CTAB  CARDIOVASCULAR: RRR, no M/R/G appreciated   ABDOMEN: soft, NTND, +BS, no rebound or guarding   MUSCULOSKELETAL: no edema  SKIN: no jaundice   PSYCHIATRIC: AOx3, appropriate insight and judgement    LABS  No results found for: \"WBC\", \"HGB\", \"PLT\"  No results found for: \"NA\", \"K\", \"CL\", \"CO2\", \"BUN\", \"CREATININE\", \"CALCIUM\", \"PROT\", \"BILITOT\", \"ALKPHOS\", \"ALT\", \"AST\", \"GLUCOSE\"  No results found " "for: \"LIPASE\", \"CRP\"    ASSESSMENT/PLAN  Cy Faria is a 68 y.o. male with a past medical history of HTN, HLD, and DM being evaluate for adenomatous colon polyps, recurrent large inflammatory hyperplastic gastric polyps, hepatic steatosis, and IBS. Fecal pancreatic elastase normal (1/2018). MRI/MRCP showed mild prominence of CBD related to prior cholecystectomy, small hepatic cysts, and fatty liver (1/2021). CT A/P w/ contrast showed mildly prominent periportal lymph nodes similar to prior (1/2023, [facility]). EGD/EUS with single 8 mm gastric prolapse polyp resected and single 1.5 cm benign lymph node at the reggie hepatis (5/2023). GERD well-controlled on Lansoprazole. IBS symptoms improved on Dicyclomine, Metamucil, and intermittent courses of Rifaximin. He is feeling well overall. He is due for surveillance colonoscopy this year.     -Plan for surveillance colonoscopy in 10/2024  -The procedure(s) including risks/benefits, diet restrictions, prep, and sedation were discussed with the patient  -Continue Lansoprazole 30 mg daily   -Continue Dicyclomine 20 mg BID PRN  -Due for surveillance EGD in 4/2025 and advised patient to call the office as we will place the order at that time to be done with one of the other [facility]    Follow-up in the office in 1 year.    Signature: Amarjit Cunha MD  "

## 2024-04-30 NOTE — PATIENT INSTRUCTIONS
Schedule colonoscopy as you are due in 10/2024. Continue Lansoprazole and Famotidine. Continue Dicyclomine. Due for surveillance upper endoscopy in 4/2025 (call the office and I can order this for you with Dr. Jackson since Dr. Lilly left). Ok to do a course of Rifaximin if you have it at home (550 mg three times per day for 14 days). Follow-up in the office in 1 year.

## 2024-06-18 ENCOUNTER — APPOINTMENT (OUTPATIENT)
Dept: ORTHOPEDIC SURGERY | Facility: CLINIC | Age: 69
End: 2024-06-18
Payer: MEDICARE

## 2024-06-18 DIAGNOSIS — M54.16 LUMBAR RADICULITIS: ICD-10-CM

## 2024-06-18 PROCEDURE — 1159F MED LIST DOCD IN RCRD: CPT | Performed by: STUDENT IN AN ORGANIZED HEALTH CARE EDUCATION/TRAINING PROGRAM

## 2024-06-18 PROCEDURE — 4010F ACE/ARB THERAPY RXD/TAKEN: CPT | Performed by: STUDENT IN AN ORGANIZED HEALTH CARE EDUCATION/TRAINING PROGRAM

## 2024-06-18 PROCEDURE — 99213 OFFICE O/P EST LOW 20 MIN: CPT | Performed by: STUDENT IN AN ORGANIZED HEALTH CARE EDUCATION/TRAINING PROGRAM

## 2024-06-18 RX ORDER — GABAPENTIN 100 MG/1
200 CAPSULE ORAL NIGHTLY
Qty: 180 CAPSULE | Refills: 1 | Status: SHIPPED | OUTPATIENT
Start: 2024-06-18 | End: 2024-12-15

## 2024-06-18 SDOH — SOCIAL STABILITY: SOCIAL NETWORK: SOCIAL ACTIVITY:: 1

## 2024-06-18 NOTE — PROGRESS NOTES
Assessment     Cy is a 68 y.o. male with significant past medical history of CKD (stage 3, last Cr 1.38), HTN, HLD, DM2 (last A1C 6.1 12/27/23), IBS/GERD, PAT, Glaucoma who presents with right leg and low back pain .  The patient's symptoms, clinical exam and imaging studies are suggestive of L5-S1 radiculopathy.  The other possible differential diagnosis(es) include(s):  disc herniation and spondylosis without myelopathy.         His symptoms have continued to improved  since the last visit with 50% improvement.  Physical Therapy and medication (gabapentin)helped significantly.    Plan     At this time, I would like to make the following recommendation/plan:  1.  Physical Therapy: continue with HEP  2.  Medication: : cont. With tylenol prn. increase gabapentin 200mg nightly (low dose 2/2 CKD)   3.  Injections: not indicated at this time given low severity of pain; may consider if symptoms worsen will need MRI first   4.  Imaging: reviewed with patient:   Xray of lumbar spine from 03/05/24 interpreted  as:  Evidence of retrolisthesis of L5 on S1  Multilevel Facet arthropathy at the level L4-S1    Follow up:  Follow up in 3 months      Subjective    Chief Complaint: right low back pain     HPI:  Cy Faria is a 68 y.o. male, retired, with pertinent PMH of CKD (stage 3, last Cr 1.38), HTN, HLD, DM2 (last A1C 6.1 12/27/23), IBS/GERD, PAT, Glaucoma who is following up today for low back and right leg to toes.  He pain first started on >10 years acutely worsening over last 6 months, without inciting event.  He was last seen on 04/16/24. Plan at the time was continue with PT referral and gabapentin.  Since his last visit symptoms have improved . Today, pain is located in the  low back and right buttock and behind the calf sometimes . Pain comes and goes lasting from (minutes), described as  aching and throbbing radiating down the lateral leg, 2/10 at its worst.    Since last visit:  - Physical Therapy: Last PT was  1 week ago. Patient has completed 11 sessions with significant benefit.   - Medication:  gabapentin 100 mg  per day, tolerated with mild sedative side effect and mild benefit.  - tylenol, voltaren help a little. Steroids cause delirium. Robaxin doesn't help    ROS:  - Sleep: Sleep isn't disrupted secondary to pain  - Bowel/Bladder: Denies new bowel/bladder dysfunction  - Falls: Denies fall  - Weight changes: Denies  - Mood/Psych: Denies any feelings of anxiety or depression    12-point review of systems was completed and is otherwise negative except as noted in the HPI.      Social Hx:  - Home: Lives alone in a house.   - ADLs: Independent in ADLs and ambulation without assistive device.   - Hobbies: golfing, walking   - Work:  retired    - Smoking/Alcohol/Drugs: No/No/No    Objective     Physical Exam  General:  Appears state age, in NAD, and obese  Psychological:  Normal mood and affect  Pulm:  Breathing comfortably on RA    Gait:   - Antalgic   - Without assistive device  - able to heel raise, able to toe raise     Inspection:   - No erythema, swelling/edema, ecchymosis or deformity noted  - normal muscle bulk of bilateral lower extremity      Sensation:  - Intact to light touch in bilateral lower extremity     Palpation:  - No tenderness to palpation of bilateral greater trochanter  - No tenderness to palpation of bilateral sacroiliac joint  - No tenderness to palpation of bilateral spinal paraspinals at the level of L4-L5  - No tenderness to palpation of spinous process at the level of L4-L5     Range of Motion:  - limited painful thoracolumbar flexion/extension  - limited painless bilateral hip flexion/internal rotation/external rotation (very tight hamstring)     Strength:  Side Hip Flexion (L2) Knee Extension (L3) Ankle Dorsiflexion (L4) Great Toe extension (L5) Ankle Plantarflexion  (S1) Hip Adduction  (L2-L4) Hip Abduction  (L5-S1)   Right 5 5 NT NT NT NT NT   Left 5 5 Nt Nt Nt Nt NT     Imaging  and Other Studies:    XR LUMBAR SPINE 4+ VIEWS WITH FLEXION EXTENSION; ;  3/5/2024 3:39 pm      INDICATION:  Signs/Symptoms:chronic low back pain.      COMPARISON:  None.      ACCESSION NUMBER(S):  [id number]      ORDERING CLINICIAN:  BRANDON QUILES      FINDINGS:  Lumbar spine, four views      There is moderate multilevel disc space narrowing osteophytosis  throughout the lumbar spine worse at L3-L4. Moderate facet disease  lower lumbar spine. There is no fracture. No spondylolisthesis. There  is no change on flexion-extension views      IMPRESSION:  Moderate multilevel spondylosis worse at L3-L4. Moderate facet  disease. No acute abnormality

## 2024-08-20 ENCOUNTER — APPOINTMENT (OUTPATIENT)
Dept: ORTHOPEDIC SURGERY | Facility: CLINIC | Age: 69
End: 2024-08-20
Payer: MEDICARE

## 2024-08-20 DIAGNOSIS — M47.816 LUMBAR SPONDYLOSIS: ICD-10-CM

## 2024-08-20 DIAGNOSIS — M54.16 LUMBAR RADICULITIS: Primary | ICD-10-CM

## 2024-08-20 PROCEDURE — 4010F ACE/ARB THERAPY RXD/TAKEN: CPT | Performed by: PHYSICAL MEDICINE & REHABILITATION

## 2024-08-20 PROCEDURE — 1159F MED LIST DOCD IN RCRD: CPT | Performed by: PHYSICAL MEDICINE & REHABILITATION

## 2024-08-20 PROCEDURE — 99214 OFFICE O/P EST MOD 30 MIN: CPT | Performed by: PHYSICAL MEDICINE & REHABILITATION

## 2024-08-20 PROCEDURE — 1036F TOBACCO NON-USER: CPT | Performed by: PHYSICAL MEDICINE & REHABILITATION

## 2024-08-20 PROCEDURE — 1160F RVW MEDS BY RX/DR IN RCRD: CPT | Performed by: PHYSICAL MEDICINE & REHABILITATION

## 2024-08-20 SDOH — SOCIAL STABILITY: SOCIAL NETWORK: SOCIAL ACTIVITY:: 1

## 2024-08-20 NOTE — PROGRESS NOTES
Follow Up Note    Today's Date:   8/20/2024     Assessment:  Cy is a 69 y.o. male with significant past medical history of CKD (stage 3, last Cr 1.38), HTN, HLD, DM2 (last A1C 6.1 12/27/23), IBS/GERD, PAT, Glaucoma who presents with right leg and low back pain .  The patient's symptoms, clinical exam and imaging studies are suggestive of L5-S1 radiculopathy.  The other possible differential diagnosis(es) include(s):  disc herniation and spondylosis without myelopathy.     PLAN:  1)  Imaging/Diagnostic Studies: We will obtain lumbar MRI given his persistent pain symptoms despite well over 6 weeks of conservative treatment.  Lumbar x-ray with multilevel spondylosis and mild retrolisthesis of L5 and S1.  Facet arthropathy from L4-S1 bilaterally  2)  Therapy/Rehabilitation: Currently in but requested new order as he does seem to be making progress  3)  Pharmacological Management: Will discuss increasing gabapentin with his PCP and/or nephrologist  4)  Spine/Surgical Interventions: None at this time  5)  Alternative Treatments: May consider alternative treatment options in the future including manipulation (chiropractor versus osteopathic) and/or acupuncture if patient does not obtain optimal relief with initial treatment plan.  6)  Consultations: No new  7)  Follow -up: Following lumbar MRI to further assess  8)  Future treatment considerations: Consider lumbar ANA    Patient advised of the difference between hurt and harm and advised to continue with all normal activities and exercises. Patient verbalized understanding of the above plan and was happy with the care provided.      The above clinical summary has been dictated with voice recognition software. It has not been proofread for grammatical errors, typographical mistakes, or other semantic inconsistencies.    Thank you for visiting our office today. It was our pleasure to take part in your healthcare.     Do not hesitate to call with any questions regarding  your plan of care after leaving at [phone]    To clinicians, thank you very much for this kind referral. It is a privilege to partner with you in the care of your patients. My office would be delighted to assist you with any further consultations or with questions regarding the plan of care outlined. Do not hesitate to call the office or contact me directly.     Sincerely,    HEIDY Clark MD  , Physical Medicine and Rehabilitation, Orthopedic Spine  [facility]  [facility]        Cy Faria  is a 69 y.o. male who was last seen 6/18/2024 by our fellow Dr. Lomas. Since the last visit the pain is about the same overall.  Continues to have lower back and right leg pain, can be a 2-5/10, feels about the same since his initial visit.    PT - completed 12 sessions and continues his HEP  Gabapentin: low dose, worries about increasing due to CKD     TREATMENTS  IN THE LAST SIX MONTHS     Active conservative therapy  in the last six months (see below)              1. Physical therapy: Yes                                                                                   2. Home exercise program after PT: Yes                                                    3. A physician supervised home exercise program (HEP):   Yes              4. Chiropractic Care:                                                                      Passive conservative therapy  in the last six months (see below)              1. NSAIDS:                                                                                                           2. Prescription pain medication:   Gabapentin                                                           3. Acupuncture:                                                                                             4. Tens unit:      Patient denies bowel/bladder incontinence, denies fever, denies unintentional weight loss,  denies clumsiness of hands, feet, or dropping things.  Denies any constitutional or myelopathic symptomatology.     ROS: Other than listed in HPI, PMHX below, the patient denies any complaint of the following: severe cough, fainting, vision or language changes, shortness of breath, chest pain, nausea, vomiting or diarrhea, rash, dysuria, seizures, excessive sweating, or bleeding problems.    I have confirmed and edited as necessary Past Medical, Past Surgical, Family, Social History and ROS as obtained by others. No new sig. changes since last visit.       PHYSICAL EXAM:   GENERAL APPEARANCE:  Well nourished, well developed, and no apparent distress.  NEURO PSYCH: Patient oriented to person, place, Mood pleasant. Benign affect.  MUSCULOSKELETAL and NEUROLOGICAL       VISUAL INSPECTION           LUMBAR: WNL  SPINE ROM:   LUMBAR ROM: Functionally full      PALPATION:           SPINOUS PROCESS: Nontender lumbar midline           PARASPINALS: Mild tenderness right lower lumbar  FACET LOADING: Negative bilateral lumbar  MUSCLE BULK: Normal and symmetrical in the upper & lower extremities.  MUSCLE TONE: Normal  MOTOR: 5/5 in all muscle groups tested in bilateral lower extremities   SENSORY: Normal sensory exam to light touch lower extremities  GAIT: Normal.  No sig. balance deficit   REFLEXES: +2 to bilateral L extremities  Slump testing: Negative  PERIPHERAL JOINT ROM:   HIP ROM: Full bilaterally  DEMETRA/Thigh Thrust/Compression/Jenna Finger:  Negative bilaterally   Hip Exam including thigh thrust and LOG ROLL: Negative bilaterally    DATA REVIEW:   The below imaging studies were personally reviewed and discussed with the patient.    Medical Decision Making:  The above note constitutes a Moderate to High level of medical decision making based on past data and imaging review, new and chronic symptoms with exacerbation, change in weakness or sensation, new imaging and diagnostic studies ordered, discussion of potential  interventional or surgical treatment options, acute or chronic pain that may pose a threat to bodily function.    Current Outpatient Medications on File Prior to Visit   Medication Sig Dispense Refill    atorvastatin (Lipitor) 10 mg tablet Take 1 tablet (10 mg) by mouth once daily.      azelastine (Astelin) 137 mcg (0.1 %) nasal spray 1 spray by Does not apply route twice a day.      blood sugar diagnostic (Blood Glucose Test) strip 1 strip 3 times a day.      dicyclomine (Bentyl) 20 mg tablet Take 1/2 to 1 up to tid prn bowel spasms.      famotidine (Pepcid) 20 mg tablet Take 1 tablet (20 mg) by mouth twice a day.      gabapentin (Neurontin) 100 mg capsule Take 2 capsules (200 mg) by mouth once daily at bedtime. 180 capsule 1    ketoconazole (NIZOral) 2 % shampoo Wash affected areas daily in the shower      lansoprazole (Prevacid) 30 mg DR capsule Take 1 capsule (30 mg) by mouth once daily.      Levemir FlexPen 100 unit/mL (3 mL) pen Inject 10 Units under the skin.      liraglutide (Victoza) 0.6 mg/0.1 mL (18 mg/3 mL) injection Inject 0.1 mL (0.6 mg) under the skin once daily.      metoprolol succinate XL (Toprol-XL) 100 mg 24 hr tablet       mometasone (Nasonex) 50 mcg/actuation nasal spray Use 2 Sprays in each nostril once daily. For patients (12yrs & older)      nateglinide (Starlix) 120 mg tablet Take 1 tablet (120 mg) by mouth 3 times a day before meals.      tamsulosin (Flomax) 0.4 mg 24 hr capsule Take 1 capsule (0.4 mg) by mouth twice a day.      telmisartan (MIcarDIS) 80 mg tablet Take 1 tablet (80 mg) by mouth once daily.      True Metrix Glucose Test Strip strip 1 Strip three times daily. Use to test sugar 3 times daily.       No current facility-administered medications on file prior to visit.        Past Medical History:   Diagnosis Date    DM (diabetes mellitus) (Multi)     Enlarged prostate     GERD (gastroesophageal reflux disease)     Personal history of other diseases of the nervous system and  sense organs     History of glaucoma    Sleep apnea         Past Surgical History:   Procedure Laterality Date    COLONOSCOPY      CYST REMOVAL Left     OTHER SURGICAL HISTORY  11/10/2020    Gallbladder surgery    TONSILLECTOMY          Allergies   Allergen Reactions    Celebrex [Celecoxib] Shortness of breath    Augmentin [Amoxicillin-Pot Clavulanate] Unknown     Stomach cramps

## 2024-08-29 ENCOUNTER — HOSPITAL ENCOUNTER (OUTPATIENT)
Dept: RADIOLOGY | Facility: CLINIC | Age: 69
Discharge: HOME | End: 2024-08-29
Payer: MEDICARE

## 2024-08-29 DIAGNOSIS — M54.16 LUMBAR RADICULITIS: ICD-10-CM

## 2024-08-29 DIAGNOSIS — M47.816 LUMBAR SPONDYLOSIS: ICD-10-CM

## 2024-08-29 PROCEDURE — 72148 MRI LUMBAR SPINE W/O DYE: CPT

## 2024-09-10 ENCOUNTER — APPOINTMENT (OUTPATIENT)
Dept: ORTHOPEDIC SURGERY | Facility: CLINIC | Age: 69
End: 2024-09-10
Payer: MEDICARE

## 2024-09-24 ENCOUNTER — APPOINTMENT (OUTPATIENT)
Dept: ORTHOPEDIC SURGERY | Facility: CLINIC | Age: 69
End: 2024-09-24
Payer: MEDICARE

## 2024-09-24 DIAGNOSIS — M54.16 LUMBAR RADICULITIS: Primary | ICD-10-CM

## 2024-09-24 DIAGNOSIS — M47.816 LUMBAR SPONDYLOSIS: ICD-10-CM

## 2024-09-24 PROCEDURE — 1159F MED LIST DOCD IN RCRD: CPT | Performed by: PHYSICAL MEDICINE & REHABILITATION

## 2024-09-24 PROCEDURE — 4010F ACE/ARB THERAPY RXD/TAKEN: CPT | Performed by: PHYSICAL MEDICINE & REHABILITATION

## 2024-09-24 PROCEDURE — 99213 OFFICE O/P EST LOW 20 MIN: CPT | Performed by: PHYSICAL MEDICINE & REHABILITATION

## 2024-09-24 RX ORDER — LATANOPROST 50 UG/ML
1 SOLUTION/ DROPS OPHTHALMIC NIGHTLY
COMMUNITY

## 2024-09-24 SDOH — SOCIAL STABILITY: SOCIAL NETWORK: SOCIAL ACTIVITY:: 1

## 2024-09-24 NOTE — PROGRESS NOTES
Follow Up Note    Today's Date:   9/24/2024     Assessment:  Cy is a pleasant 69 y.o. male with significant past medical history of CKD (stage 3, last Cr 1.38), HTN, HLD, DM2 (last A1C 6.1 12/27/23), IBS/GERD, PAT, Glaucoma who presents with right leg and low back pain .  The patient's symptoms, clinical exam and imaging studies are suggestive of L5-S1 radiculopathy.  The other possible differential diagnosis(es) include(s):  disc herniation and spondylosis without myelopathy.     -September 24, 2024 update: Very happy with his current progress with minimal pain at this time.  He will follow-up with us on an as-needed basis    PLAN:  1)  Imaging/Diagnostic Studies: Reviewed and personally interpreted lumbar spine MRI; multilevel lumbar spine spondylosis with foraminal stenosis at L4-5 and L5-S1. Discussed with patient.  2)  Therapy/Rehabilitation: Continue PT- patient progressing  3)  Pharmacological Management: Taking Gabapentin 100mg daily  4)  Spine/Surgical Interventions: None at this time  5)  Alternative Treatments: May consider alternative treatment options in the future including manipulation (chiropractor versus osteopathic) and/or acupuncture if patient does not obtain optimal relief with initial treatment plan.  6)  Consultations: [facility] for acupuncture  7)  Follow -up:  PRN  8)  Future treatment considerations: if pain worsens consider medication management and/or possible spine interventions    Patient advised of the difference between hurt and harm and advised to continue with all normal activities and exercises. Patient verbalized understanding of the above plan and was happy with the care provided.      The above clinical summary has been dictated with voice recognition software. It has not been proofread for grammatical errors, typographical mistakes, or other semantic inconsistencies.    Thank you for visiting our office today. It was our pleasure to take part in your healthcare.      Do not hesitate to call with any questions regarding your plan of care after leaving at [phone]    To clinicians, thank you very much for this kind referral. It is a privilege to partner with you in the care of your patients. My office would be delighted to assist you with any further consultations or with questions regarding the plan of care outlined. Do not hesitate to call the office or contact me directly.     Sincerely,    Priscila Cole MD  Fellow [facility], PM&R      A. Cy Clark MD  , Physical Medicine and Rehabilitation, Orthopedic Spine  [employer] [facility] [employer]  [facility]        Cy Faria  is a 69 y.o. male who was last seen 8/20/24 for back pain and right leg pain.    *Update 9/24/24: Patient is here for follow up regarding his pain. His pain  level has diminished to 1/10 at its worst 3/10. He states dry needling is helping significantly. Patient remains on gabapentin 100 mg once a day, at a low dose due to his CKD. Patient reports he has had overall improvement.  Patient reports he aggravates the back pain when he sleeps at night due to positioning. Patient reports pain goes down the R leg but it does not extend as far or as often as it used to.      TREATMENTS  IN THE LAST SIX MONTHS     Active conservative therapy  in the last six months (see below)              1. Physical therapy: Yes                                                                                   2. Home exercise program after PT: Yes                                                    3. A physician supervised home exercise program (HEP):   Yes              4. Chiropractic Care:     No                                                                 Passive conservative therapy  in the last six months (see below)              1. NSAIDS:    No, used to take Advil but tries to minimize due to kidney hx                                                                                                        2. Prescription pain medication:   Gabapentin                                                           3. Acupuncture:        no                                                                                     4. Tens unit: no     Patient denies bowel/bladder incontinence, denies fever, denies unintentional weight loss, denies clumsiness of hands, feet, or dropping things.  Denies any constitutional or myelopathic symptomatology.     ROS: Other than listed in HPI, PMHX below, the patient denies any complaint of the following: severe cough, fainting, vision or language changes, shortness of breath, chest pain, nausea, vomiting or diarrhea, rash, dysuria, seizures, excessive sweating, or bleeding problems.    I have confirmed and edited as necessary Past Medical, Past Surgical, Family, Social History and ROS as obtained by others. No new sig. changes since last visit.       PHYSICAL EXAM:   GENERAL APPEARANCE:  Well nourished, well developed, and no apparent distress.  NEURO PSYCH: Patient oriented to person, place, Mood pleasant. Benign affect.  MUSCULOSKELETAL and NEUROLOGICAL       VISUAL INSPECTION           LUMBAR: WNL  SPINE ROM: Worse with extension than flexion  LUMBAR ROM: Functionally full- unchanged      PALPATION:           SPINOUS PROCESS: Nontender lumbar midline- unchanged           PARASPINALS: Mild tenderness right lower lumbar, and mildly tender at R SI Joint  FACET LOADING: Negative bilateral lumbar  MUSCLE BULK: Normal and symmetrical in the upper & lower extremities.  MUSCLE TONE: Normal  MOTOR: 5/5 in all muscle groups tested in bilateral lower extremities   SENSORY: Normal sensory exam to light touch lower extremities  GAIT: Normal.  No sig. balance deficit   REFLEXES: +1 to bilateral L extremities  Slump testing: Negative  PERIPHERAL JOINT ROM:   HIP ROM: Full bilaterally  DEMETRA/Thigh Thrust/Compression/Jenna Finger:   Negative bilaterally   Hip Exam including thigh thrust and LOG ROLL: Negative bilaterally    DATA REVIEW:   The below imaging studies were personally reviewed and discussed with the patient.    Medical Decision Making:  The above note constitutes a Moderate to High level of medical decision making based on past data and imaging review, new and chronic symptoms with exacerbation, change in weakness or sensation, new imaging and diagnostic studies ordered, discussion of potential interventional or surgical treatment options, acute or chronic pain that may pose a threat to bodily function.    Current Outpatient Medications on File Prior to Visit   Medication Sig Dispense Refill    latanoprost (Xalatan) 0.005 % ophthalmic solution 1 drop once daily at bedtime.      atorvastatin (Lipitor) 10 mg tablet Take 1 tablet (10 mg) by mouth once daily.      azelastine (Astelin) 137 mcg (0.1 %) nasal spray 1 spray by Does not apply route twice a day.      blood sugar diagnostic (Blood Glucose Test) strip 1 strip 3 times a day.      dicyclomine (Bentyl) 20 mg tablet Take 1/2 to 1 up to tid prn bowel spasms.      famotidine (Pepcid) 20 mg tablet Take 1 tablet (20 mg) by mouth twice a day.      gabapentin (Neurontin) 100 mg capsule Take 2 capsules (200 mg) by mouth once daily at bedtime. 180 capsule 1    ketoconazole (NIZOral) 2 % shampoo Wash affected areas daily in the shower      lansoprazole (Prevacid) 30 mg DR capsule Take 1 capsule (30 mg) by mouth once daily.      Levemir FlexPen 100 unit/mL (3 mL) pen Inject 10 Units under the skin.      liraglutide (Victoza) 0.6 mg/0.1 mL (18 mg/3 mL) injection Inject 0.1 mL (0.6 mg) under the skin once daily.      metoprolol succinate XL (Toprol-XL) 100 mg 24 hr tablet       mometasone (Nasonex) 50 mcg/actuation nasal spray Use 2 Sprays in each nostril once daily. For patients (12yrs & older)      nateglinide (Starlix) 120 mg tablet Take 1 tablet (120 mg) by mouth 3 times a day before  meals.      tamsulosin (Flomax) 0.4 mg 24 hr capsule Take 1 capsule (0.4 mg) by mouth twice a day.      telmisartan (MIcarDIS) 80 mg tablet Take 1 tablet (80 mg) by mouth once daily.      True Metrix Glucose Test Strip strip 1 Strip three times daily. Use to test sugar 3 times daily.       No current facility-administered medications on file prior to visit.        Past Medical History:   Diagnosis Date    DM (diabetes mellitus) (Multi)     Enlarged prostate     GERD (gastroesophageal reflux disease)     Personal history of other diseases of the nervous system and sense organs     History of glaucoma    Sleep apnea         Past Surgical History:   Procedure Laterality Date    COLONOSCOPY      CYST REMOVAL Left     OTHER SURGICAL HISTORY  11/10/2020    Gallbladder surgery    TONSILLECTOMY          Allergies   Allergen Reactions    Celebrex [Celecoxib] Shortness of breath    Augmentin [Amoxicillin-Pot Clavulanate] Unknown     Stomach cramps

## 2024-10-08 ENCOUNTER — APPOINTMENT (OUTPATIENT)
Dept: GASTROENTEROLOGY | Facility: EXTERNAL LOCATION | Age: 69
End: 2024-10-08
Payer: MEDICARE

## 2024-10-08 DIAGNOSIS — Z86.0100 HISTORY OF COLONIC POLYPS: ICD-10-CM

## 2024-10-08 DIAGNOSIS — Z12.11 ENCOUNTER FOR SCREENING FOR MALIGNANT NEOPLASM OF COLON: Primary | ICD-10-CM

## 2024-10-08 DIAGNOSIS — Z86.0101 HISTORY OF ADENOMATOUS POLYP OF COLON: ICD-10-CM

## 2024-10-08 DIAGNOSIS — D12.5 BENIGN NEOPLASM OF SIGMOID COLON: ICD-10-CM

## 2024-10-08 PROCEDURE — 4010F ACE/ARB THERAPY RXD/TAKEN: CPT | Performed by: INTERNAL MEDICINE

## 2024-10-08 PROCEDURE — 45385 COLONOSCOPY W/LESION REMOVAL: CPT | Performed by: INTERNAL MEDICINE

## 2024-10-08 PROCEDURE — 88305 TISSUE EXAM BY PATHOLOGIST: CPT | Performed by: STUDENT IN AN ORGANIZED HEALTH CARE EDUCATION/TRAINING PROGRAM

## 2024-10-08 PROCEDURE — 88305 TISSUE EXAM BY PATHOLOGIST: CPT

## 2024-10-09 ENCOUNTER — LAB REQUISITION (OUTPATIENT)
Dept: LAB | Facility: HOSPITAL | Age: 69
End: 2024-10-09
Payer: MEDICARE

## 2024-10-16 LAB
LABORATORY COMMENT REPORT: NORMAL
PATH REPORT.FINAL DX SPEC: NORMAL
PATH REPORT.GROSS SPEC: NORMAL
PATH REPORT.TOTAL CANCER: NORMAL
RESIDENT REVIEW: NORMAL

## 2024-12-12 ENCOUNTER — APPOINTMENT (OUTPATIENT)
Dept: INTEGRATIVE MEDICINE | Facility: CLINIC | Age: 69
End: 2024-12-12
Payer: MEDICARE

## 2024-12-27 ENCOUNTER — TELEPHONE (OUTPATIENT)
Dept: GASTROENTEROLOGY | Facility: CLINIC | Age: 69
End: 2024-12-27
Payer: MEDICARE

## 2024-12-27 NOTE — TELEPHONE ENCOUNTER
Pt called today stating, he was in the hospital for 8 days then another 6 days in a rehab facility, he was able to be sent home today he is wanting to he get your opinion of what happened, and what might have caused it to happen. He is requesting a phone call back [phone]

## 2025-01-07 ENCOUNTER — APPOINTMENT (OUTPATIENT)
Dept: INTEGRATIVE MEDICINE | Facility: CLINIC | Age: 70
End: 2025-01-07
Payer: MEDICARE

## 2025-01-09 ENCOUNTER — OFFICE VISIT (OUTPATIENT)
Dept: GASTROENTEROLOGY | Facility: CLINIC | Age: 70
End: 2025-01-09
Payer: MEDICARE

## 2025-01-09 VITALS — BODY MASS INDEX: 38.65 KG/M2 | WEIGHT: 255 LBS | HEIGHT: 68 IN | HEART RATE: 92 BPM

## 2025-01-09 DIAGNOSIS — R19.8 ALTERNATING CONSTIPATION AND DIARRHEA: ICD-10-CM

## 2025-01-09 DIAGNOSIS — K21.9 GASTROESOPHAGEAL REFLUX DISEASE WITHOUT ESOPHAGITIS: ICD-10-CM

## 2025-01-09 DIAGNOSIS — Z86.0101 HISTORY OF ADENOMATOUS POLYP OF COLON: ICD-10-CM

## 2025-01-09 DIAGNOSIS — K31.7 GASTRIC POLYPS: Primary | ICD-10-CM

## 2025-01-09 PROCEDURE — 1036F TOBACCO NON-USER: CPT | Performed by: INTERNAL MEDICINE

## 2025-01-09 PROCEDURE — 3008F BODY MASS INDEX DOCD: CPT | Performed by: INTERNAL MEDICINE

## 2025-01-09 PROCEDURE — 99214 OFFICE O/P EST MOD 30 MIN: CPT | Performed by: INTERNAL MEDICINE

## 2025-01-09 PROCEDURE — 4010F ACE/ARB THERAPY RXD/TAKEN: CPT | Performed by: INTERNAL MEDICINE

## 2025-01-09 PROCEDURE — 1159F MED LIST DOCD IN RCRD: CPT | Performed by: INTERNAL MEDICINE

## 2025-01-09 RX ORDER — LIDOCAINE 560 MG/1
1 PATCH PERCUTANEOUS; TOPICAL; TRANSDERMAL
COMMUNITY
Start: 2024-12-23

## 2025-01-09 RX ORDER — ERGOCALCIFEROL 1.25 MG/1
1 CAPSULE ORAL
COMMUNITY
Start: 2024-09-26

## 2025-01-09 RX ORDER — ACETAMINOPHEN 325 MG/1
650 TABLET ORAL EVERY 4 HOURS PRN
COMMUNITY
Start: 2024-12-23

## 2025-01-09 RX ORDER — TRAZODONE HYDROCHLORIDE 50 MG/1
50 TABLET ORAL DAILY PRN
COMMUNITY
Start: 2024-12-23

## 2025-01-09 RX ORDER — AZELASTINE HYDROCHLORIDE 0.5 MG/ML
1 SOLUTION/ DROPS OPHTHALMIC 2 TIMES DAILY
COMMUNITY
Start: 2024-05-21

## 2025-01-09 RX ORDER — AMLODIPINE BESYLATE 5 MG/1
5 TABLET ORAL DAILY
COMMUNITY
Start: 2025-01-08

## 2025-01-09 RX ORDER — METOPROLOL SUCCINATE 50 MG/1
50 TABLET, EXTENDED RELEASE ORAL DAILY
COMMUNITY

## 2025-01-09 ASSESSMENT — ENCOUNTER SYMPTOMS: SHORTNESS OF BREATH: 0

## 2025-01-09 NOTE — PATIENT INSTRUCTIONS
Call and schedule your upper endoscopy at [facility] for 4/2025. Start Metamucil powder 2 teaspoonfuls mixed with 8 ounces of juice or water once daily for 1 week. Increase to twice daily thereafter as needed. Follow-up in the office in 1 year.

## 2025-01-09 NOTE — PROGRESS NOTES
REASON FOR VISIT:  GERD  PCP (requesting provider): Gerardo Castillo DO.    HPI:  Cy Faria is a 69 y.o. male with a past medical history of CKD, HTN, HLD, and DM being evaluated for GERD, adenomatous colon polyps, recurrent large inflammatory hyperplastic gastric polyps, hepatic steatosis, and IBS. Fecal pancreatic elastase normal (1/2018). GERD well-controlled on Lansoprazole. IBS symptoms improved on Dicyclomine, Metamucil, and intermittent courses of Rifaximin. He is feeling well overall.     The patient was admitted over the holidays with renal dysfunction. He had ALEC and this is improving. This was in the setting of diarrhea. He alternated between diarrhea and constipation now. Stool is solid and pasty. No blood in the stool. He is due for surveillance upper endoscopy in 4/2024 and had been following with Dr. Lilly but now will need a new Advanced Endoscopist in regards to the large gastric polyps. No NSAIDs. He continues on Dicyclomine. He is on Prevacid and Famotidine as well.    PSurgHx:  -Cholecystectomy      FamHx: No GI cancer      Prior Endoscopy:  -Colonoscopy (10/2024): Good prep, three 3-6 mm sigmoid polyps (TA x 3), moderate sigmoid diverticulosis, medium sized IH/EH, otherwise normal colon.  -EGD (4/2024): Normal esophagus, inflammatory appearing gastric antral polyp removed (partially eroded HP, no dysplasia), 2 mm duodenal polyp removed (isolated lymphangioma, no dysplasia).  -EGD/EUS (5/2023): Normal esophagus, medium retained food in the stomach, gastric erythema, single 8 mm inflammatory gastric polyp (mucosal prolapse polyp), diffuse pancreatic echogenicity and hyperechoic foci, PD with few prominent side branches, single benign 1.5 cm lymph node at the reggie hepatis.  -EGD (11/2022): Normal esophagus, mild gastric erythema (H. pylori -), inflammatory appearing 1 cm pedunculated gastric polyp resected (HP), normal duodenum.  -EGD (11/2021): Normal esophagus, multiple small fundic  gland polyps in body and fundus, single 1.5 cm gastric antral polyp resected (HP with ulceration and no dysplasia), normal duodenum.   -Colonoscopy (10/2021): Good prep, 6 mm cecal polyp (TA), 5 mm descending polyp (TA), 7 mm sigmoid polyps (lymphoid aggregate), mild sigmoid diverticulosis, medium sized IH/EH, otherwise normal colon.   -EGD (10/2021): Normal esophagus, multiple small to medium sized fundic gland polyps in body and fundus (fundic gland polyps), single 2.5 cm gastric antral polyp (HP with ulceration), normal duodenum.   -EGD (10/2020): Gastritis and gastric polyps.  -Colonoscopy (10/2020): Significant stool, tubular adenoma and sessile serrated adenoma colon polyps, small IH, otherwise normal colon.     PAST MEDICAL HISTORY  Past Medical History:   Diagnosis Date    DM (diabetes mellitus) (Multi)     Enlarged prostate     GERD (gastroesophageal reflux disease)     Personal history of other diseases of the nervous system and sense organs     History of glaucoma    Sleep apnea        PAST SURGICAL HISTORY  Past Surgical History:   Procedure Laterality Date    COLONOSCOPY      CYST REMOVAL Left     OTHER SURGICAL HISTORY  11/10/2020    Gallbladder surgery    TONSILLECTOMY         FAMILY HISTORY  No family history on file.    SOCIAL HISTORY   reports that he has never smoked. He has never used smokeless tobacco. He reports that he does not currently use alcohol. He reports that he does not currently use drugs.    REVIEW OF SYSTEMS  Review of Systems   Respiratory:  Negative for shortness of breath.    Cardiovascular:  Negative for chest pain.   All other systems reviewed and are negative.    A 10+ point review of systems was otherwise negative except as noted and per HPI.    ALLERGIES  Allergies   Allergen Reactions    Celebrex [Celecoxib] Shortness of breath    Cefuroxime Diarrhea     caused diarrhea    Iodine Unknown     ALEC due to MILEY    Augmentin [Amoxicillin-Pot Clavulanate] Unknown     Stomach  cramps       MEDICATIONS  Current Outpatient Medications   Medication Instructions    acetaminophen (TYLENOL) 650 mg, Every 4 hours PRN    amLODIPine (NORVASC) 5 mg, Daily    atorvastatin (LIPITOR) 10 mg, Daily RT    azelastine (Astelin) 137 mcg (0.1 %) nasal spray 1 spray, 2 times daily    azelastine (Optivar) 0.05 % ophthalmic solution 1 drop, 2 times daily    blood sugar diagnostic (Blood Glucose Test) strip 1 strip, 3 times daily    dicyclomine (Bentyl) 20 mg tablet Take 1/2 to 1 up to tid prn bowel spasms.    ergocalciferol (Vitamin D-2) 1.25 MG (81978 UT) capsule 1 capsule, Once Weekly    famotidine (PEPCID) 20 mg, 2 times daily    gabapentin (NEURONTIN) 200 mg, oral, Nightly    ketoconazole (NIZOral) 2 % shampoo Wash affected areas daily in the shower    lansoprazole (PREVACID) 30 mg, oral, Daily RT    latanoprost (Xalatan) 0.005 % ophthalmic solution 1 drop, Nightly    Levemir FlexPen 10 Units, subcutaneous    lidocaine 4 % patch 1 patch, Daily RT    liraglutide (VICTOZA) 0.6 mg, Daily RT    metoprolol succinate XL (Toprol-XL) 100 mg 24 hr tablet     metoprolol succinate XL (TOPROL-XL) 50 mg, Daily    mometasone (Nasonex) 50 mcg/actuation nasal spray Use 2 Sprays in each nostril once daily. For patients (12yrs & older)    nateglinide (Starlix) 120 mg tablet 1 tablet, 3 times daily before meals    tamsulosin (FLOMAX) 0.4 mg, 2 times daily    telmisartan (MICARDIS) 80 mg, Daily RT    traZODone (DESYREL) 50 mg, Daily PRN    True Metrix Glucose Test Strip strip 1 Strip three times daily. Use to test sugar 3 times daily.       VITALS  Vitals:    01/09/25 1443   Pulse: 92      Body mass index is 38.77 kg/m².    PHYSICAL EXAM  CONSTITUTIONAL: NAD, appears stated age  EYES: anicteric sclera, sclera clear  HEAD: normocephalic, atraumatic   NECK: supple   PULMONARY: CTAB  CARDIOVASCULAR: RRR, no M/R/G appreciated   ABDOMEN: soft, NTND, +BS, no rebound or guarding   MUSCULOSKELETAL: no edema  SKIN: no jaundice  "  PSYCHIATRIC: AOx3, appropriate insight and judgement    LABS  No results found for: \"WBC\", \"HGB\", \"PLT\"  No results found for: \"NA\", \"K\", \"CL\", \"CO2\", \"BUN\", \"CREATININE\", \"CALCIUM\", \"PROT\", \"BILITOT\", \"ALKPHOS\", \"ALT\", \"AST\", \"GLUCOSE\"  No results found for: \"LIPASE\", \"CRP\"    ASSESSMENT/PLAN  Cy Faria is a 69 y.o. male with a past medical history of CKD, HTN, HLD, and DM being evaluated for GERD, adenomatous colon polyps, recurrent large inflammatory hyperplastic gastric polyps, hepatic steatosis, and IBS. Fecal pancreatic elastase normal (1/2018). GERD well-controlled on Lansoprazole. IBS symptoms improved on Dicyclomine, Metamucil, and intermittent courses of Rifaximin. He us due for surveillance EGD upcoming. He had recent admission for infectious gastroenteritis that led to ALEC now improving. Bowel habits are still erratic so we will trial a fiber supplement.     -Plan [facility] E[facility]   -The procedure(s) including risks/benefits, diet restrictions, prep, and sedation were discussed with the patient  -Continue Lansoprazole 30 mg daily   -Continue Dicyclomine 20 mg BID PRN  -Due for surveillance colonoscopy in 10/2027     Follow-up in the office in 1 year.    Signature: Amarjit Cunha MD  "

## 2025-01-16 DIAGNOSIS — R10.9 ABDOMINAL PAIN, UNSPECIFIED ABDOMINAL LOCATION: Primary | ICD-10-CM

## 2025-01-20 ENCOUNTER — HOSPITAL ENCOUNTER (OUTPATIENT)
Dept: RADIOLOGY | Facility: CLINIC | Age: 70
Discharge: HOME | End: 2025-01-20
Payer: MEDICARE

## 2025-01-20 DIAGNOSIS — R10.9 ABDOMINAL PAIN, UNSPECIFIED ABDOMINAL LOCATION: ICD-10-CM

## 2025-01-20 PROCEDURE — 76700 US EXAM ABDOM COMPLETE: CPT

## 2025-01-20 PROCEDURE — 76700 US EXAM ABDOM COMPLETE: CPT | Performed by: RADIOLOGY

## 2025-03-24 ENCOUNTER — APPOINTMENT (OUTPATIENT)
Facility: CLINIC | Age: 70
End: 2025-03-24
Payer: MEDICARE

## 2025-04-09 ENCOUNTER — OFFICE VISIT (OUTPATIENT)
Dept: ORTHOPEDIC SURGERY | Facility: CLINIC | Age: 70
End: 2025-04-09
Payer: MEDICARE

## 2025-04-09 DIAGNOSIS — M79.642 LEFT HAND PAIN: Primary | ICD-10-CM

## 2025-04-09 PROCEDURE — 99213 OFFICE O/P EST LOW 20 MIN: CPT | Performed by: ORTHOPAEDIC SURGERY

## 2025-04-09 PROCEDURE — 1036F TOBACCO NON-USER: CPT | Performed by: ORTHOPAEDIC SURGERY

## 2025-04-09 PROCEDURE — 4010F ACE/ARB THERAPY RXD/TAKEN: CPT | Performed by: ORTHOPAEDIC SURGERY

## 2025-04-09 PROCEDURE — 1159F MED LIST DOCD IN RCRD: CPT | Performed by: ORTHOPAEDIC SURGERY

## 2025-04-09 RX ORDER — FERROUS SULFATE 325(65) MG
325 TABLET ORAL 2 TIMES DAILY
COMMUNITY
Start: 2025-03-26 | End: 2026-03-26

## 2025-04-09 RX ORDER — ASCORBIC ACID, THIAMINE MONONITRATE,RIBOFLAVIN, NIACINAMIDE, PYRIDOXINE HYDROCHLORIDE, FOLIC ACID, CYANOCOBALAMIN, BIOTIN, CALCIUM PANTOTHENATE, 100; 1.5; 1.7; 20; 10; 1; 6000; 150000; 5 MG/1; MG/1; MG/1; MG/1; MG/1; MG/1; UG/1; UG/1; MG/1
1 CAPSULE, LIQUID FILLED ORAL
COMMUNITY
Start: 2025-03-26 | End: 2026-03-26

## 2025-04-09 NOTE — PROGRESS NOTES
Subjective    Patient ID: Cy Faria is a 69 y.o. male.    Chief Complaint: FOREIGN BODY of the Left Index Finger (NPV L 2ND - CONCERNS OF FOREIGN BODY X 1 MONTH NKI/PT DENIES ANY SYMPTOMS/STATES THAT THEY DONT FEEL ANYTHING IS IN THERE TODAY IN COMPARISON TO A FEW WEEKS AGO/XRYAYS DONE AT [facility] IN PACS)     Last Surgery: No surgery found  Last Surgery Date: No surgery found    HPI  The patient is a 69-year-old right-hand-dominant male who comes in with complaint of pain near his left index finger DIP flexion crease.  He states this first occurred approximately 1 month ago after he gave himself an injection.  However in the past week he noted that that sensation has resolved.  He denies having any fevers or chills.  He has been using his left hand without difficulty.  The patient pointed to the radial aspect of the DIP flexion crease in his index finger where he felt the sensation.  Objective   Ortho Exam  The patient is in no acute distress.  Exam of his left hand reveals the skin envelope is intact.  There is no warmth erythema.  There is no evidence of infection.  He has appropriate function of his EDC, FDS and FDP.  He has no areas of point tenderness.  There is no palpable subcutaneous mass.    Image Results:  X-rays of his left index finger from an outside hospital reviewed.  There was a questionable linear foreign object in the soft tissues of his left index finger but this was located near the base of the middle phalanx ulnarly.    Assessment/Plan   Encounter Diagnoses:  Left hand pain    The patient had left index finger pain from what he thought could have been a foreign body.  However on exam he had no areas of point tenderness.  There is no palpable foreign object.  And the region he pointed to as the potential entry point did not match the findings on x-ray.  At this point I explained to the patient he likely does not have a foreign object in his index finger.  He may use his left hand is much as he  can tolerate.  He will follow-up as his symptoms dictate.

## 2025-04-15 NOTE — H&P
History Of Present Illness  Cy Faria is a 69 y.o. male presenting with follow up of gastric polyp.     Past Medical History  Past Medical History:   Diagnosis Date    Chronic diarrhea     Chronic kidney disease     Colon polyp     DM (diabetes mellitus) (Multi)     Enlarged prostate     GERD (gastroesophageal reflux disease)     Hypertension     Irritable bowel syndrome     Lumbosacral disc disease 2014    Personal history of other diseases of the nervous system and sense organs     History of glaucoma    Sleep apnea      Surgical History  Past Surgical History:   Procedure Laterality Date    CHOLECYSTECTOMY  1984    COLONOSCOPY      CYST REMOVAL Left     OTHER SURGICAL HISTORY  11/10/2020    Gallbladder surgery    TONSILLECTOMY       Social History  He reports that he has never smoked. He has never used smokeless tobacco. He reports that he does not currently use alcohol. He reports that he does not currently use drugs.    Family History  Family History   Problem Relation Name Age of Onset    Diabetes Mother Margarita     Hypertension Mother Margarita     Kidney disease Mother Margarita     Cancer Father Cy Sr.         Allergies  Allergies   Allergen Reactions    Celebrex [Celecoxib] Shortness of breath    Cefuroxime Diarrhea     caused diarrhea    Iodine Unknown     ALEC due to MILEY    Augmentin [Amoxicillin-Pot Clavulanate] Unknown     Stomach cramps     Review of Systems     Physical Exam  Vitals and nursing note reviewed.   Constitutional:       Appearance: Normal appearance.   Cardiovascular:      Rate and Rhythm: Normal rate and regular rhythm.      Pulses: Normal pulses.      Heart sounds: Normal heart sounds.   Pulmonary:      Effort: Pulmonary effort is normal.      Breath sounds: Normal breath sounds.   Abdominal:      General: Abdomen is flat.      Palpations: Abdomen is soft.   Neurological:      Mental Status: He is alert.          Last Recorded Vitals  There were no vitals taken for this  visit.    Assessment/Plan   Gastric polyp    Proceed with EGD     Phillip Jackson MD

## 2025-04-16 ENCOUNTER — ANESTHESIA EVENT (OUTPATIENT)
Dept: GASTROENTEROLOGY | Facility: HOSPITAL | Age: 70
End: 2025-04-16
Payer: MEDICARE

## 2025-04-16 ENCOUNTER — HOSPITAL ENCOUNTER (OUTPATIENT)
Dept: GASTROENTEROLOGY | Facility: HOSPITAL | Age: 70
Discharge: HOME | End: 2025-04-16
Payer: MEDICARE

## 2025-04-16 ENCOUNTER — ANESTHESIA (OUTPATIENT)
Dept: GASTROENTEROLOGY | Facility: HOSPITAL | Age: 70
End: 2025-04-16
Payer: MEDICARE

## 2025-04-16 VITALS
TEMPERATURE: 97.7 F | SYSTOLIC BLOOD PRESSURE: 143 MMHG | DIASTOLIC BLOOD PRESSURE: 73 MMHG | HEART RATE: 75 BPM | HEIGHT: 68 IN | RESPIRATION RATE: 16 BRPM | OXYGEN SATURATION: 98 % | BODY MASS INDEX: 39.83 KG/M2 | WEIGHT: 262.79 LBS

## 2025-04-16 DIAGNOSIS — K31.7 GASTRIC POLYPS: ICD-10-CM

## 2025-04-16 LAB — GLUCOSE BLD MANUAL STRIP-MCNC: 115 MG/DL (ref 74–99)

## 2025-04-16 PROCEDURE — 2500000004 HC RX 250 GENERAL PHARMACY W/ HCPCS (ALT 636 FOR OP/ED): Performed by: ANESTHESIOLOGIST ASSISTANT

## 2025-04-16 PROCEDURE — 3700000002 HC GENERAL ANESTHESIA TIME - EACH INCREMENTAL 1 MINUTE

## 2025-04-16 PROCEDURE — A43239 PR EDG TRANSORAL BIOPSY SINGLE/MULTIPLE: Performed by: ANESTHESIOLOGIST ASSISTANT

## 2025-04-16 PROCEDURE — 82947 ASSAY GLUCOSE BLOOD QUANT: CPT

## 2025-04-16 PROCEDURE — 43239 EGD BIOPSY SINGLE/MULTIPLE: CPT | Performed by: INTERNAL MEDICINE

## 2025-04-16 PROCEDURE — 7100000009 HC PHASE TWO TIME - INITIAL BASE CHARGE

## 2025-04-16 PROCEDURE — 3700000001 HC GENERAL ANESTHESIA TIME - INITIAL BASE CHARGE

## 2025-04-16 PROCEDURE — 7100000010 HC PHASE TWO TIME - EACH INCREMENTAL 1 MINUTE

## 2025-04-16 PROCEDURE — A43239 PR EDG TRANSORAL BIOPSY SINGLE/MULTIPLE: Performed by: ANESTHESIOLOGY

## 2025-04-16 RX ORDER — PROPOFOL 10 MG/ML
INJECTION, EMULSION INTRAVENOUS AS NEEDED
Status: DISCONTINUED | OUTPATIENT
Start: 2025-04-16 | End: 2025-04-16

## 2025-04-16 RX ORDER — LIDOCAINE HYDROCHLORIDE 20 MG/ML
INJECTION, SOLUTION INFILTRATION; PERINEURAL AS NEEDED
Status: DISCONTINUED | OUTPATIENT
Start: 2025-04-16 | End: 2025-04-16

## 2025-04-16 RX ADMIN — SODIUM CHLORIDE, SODIUM LACTATE, POTASSIUM CHLORIDE, AND CALCIUM CHLORIDE: .6; .31; .03; .02 INJECTION, SOLUTION INTRAVENOUS at 07:43

## 2025-04-16 RX ADMIN — PROPOFOL 150 MCG/KG/MIN: 10 INJECTION, EMULSION INTRAVENOUS at 07:46

## 2025-04-16 RX ADMIN — PROPOFOL 40 MG: 10 INJECTION, EMULSION INTRAVENOUS at 07:47

## 2025-04-16 RX ADMIN — LIDOCAINE HYDROCHLORIDE 100 MG: 20 INJECTION, SOLUTION INFILTRATION; PERINEURAL at 07:45

## 2025-04-16 RX ADMIN — PROPOFOL 60 MG: 10 INJECTION, EMULSION INTRAVENOUS at 07:45

## 2025-04-16 ASSESSMENT — PAIN - FUNCTIONAL ASSESSMENT
PAIN_FUNCTIONAL_ASSESSMENT: 0-10

## 2025-04-16 ASSESSMENT — COLUMBIA-SUICIDE SEVERITY RATING SCALE - C-SSRS
6. HAVE YOU EVER DONE ANYTHING, STARTED TO DO ANYTHING, OR PREPARED TO DO ANYTHING TO END YOUR LIFE?: NO
2. HAVE YOU ACTUALLY HAD ANY THOUGHTS OF KILLING YOURSELF?: NO
1. IN THE PAST MONTH, HAVE YOU WISHED YOU WERE DEAD OR WISHED YOU COULD GO TO SLEEP AND NOT WAKE UP?: NO

## 2025-04-16 ASSESSMENT — PAIN SCALES - GENERAL
PAINLEVEL_OUTOF10: 0 - NO PAIN

## 2025-04-16 NOTE — ANESTHESIA POSTPROCEDURE EVALUATION
Patient: Cy Faria    Procedure Summary       Date: 04/16/25 Room / Location: [facility]    Anesthesia Start: 0743 Anesthesia Stop: 0758    Procedure: EGD Diagnosis: Gastric polyps    Scheduled Providers: Phillip Jackson MD; Sreekanth Arceo MD; NISA Michel Responsible Provider: Sreekanth Arceo MD    Anesthesia Type: MAC ASA Status: 3            Anesthesia Type: MAC    Vitals Value Taken Time   /73 04/16/25 08:25   Temp 36.5 °C (97.7 °F) 04/16/25 07:55   Pulse 75 04/16/25 08:25   Resp 16 04/16/25 08:25   SpO2 98 % 04/16/25 08:25       Anesthesia Post Evaluation    Patient location during evaluation: PACU  Patient participation: complete - patient participated  Level of consciousness: awake and alert  Pain management: adequate  Airway patency: patent  Cardiovascular status: acceptable and hemodynamically stable  Respiratory status: acceptable, spontaneous ventilation and nonlabored ventilation  Hydration status: acceptable  Postoperative Nausea and Vomiting: none        There were no known notable events for this encounter.

## 2025-04-16 NOTE — ANESTHESIA PREPROCEDURE EVALUATION
Patient: Cy Faira    Procedure Information       Date/Time: 04/16/25 2577    Scheduled providers: Phillip Jackson MD; Sreekanth Arceo MD; NISA Michel    Procedure: EGD    Location: [facility]            Relevant Problems   Cardiac   (+) HTN (hypertension)   (+) Hyperlipidemia      Pulmonary   (+) PAT (obstructive sleep apnea)      GI   (+) Gastroesophageal reflux disease      /Renal   (+) Chronic renal insufficiency      Endocrine   (+) Diabetes mellitus, type 2 (Multi)   (+) Obesity      HEENT   (+) Glaucoma       Clinical information reviewed:   Tobacco  Allergies  Meds   Med Hx  Surg Hx   Fam Hx  Soc Hx         Medical History[1]   Surgical History[2]  Social History[3]   Current Outpatient Medications   Medication Instructions    acetaminophen (TYLENOL) 650 mg, Every 4 hours PRN    amLODIPine (NORVASC) 5 mg, Daily    atorvastatin (LIPITOR) 10 mg, Daily RT    azelastine (Astelin) 137 mcg (0.1 %) nasal spray 1 spray, 2 times daily    azelastine (Optivar) 0.05 % ophthalmic solution 1 drop, 2 times daily    blood sugar diagnostic (Blood Glucose Test) strip 1 strip, 3 times daily    dicyclomine (Bentyl) 20 mg tablet Take 1/2 to 1 up to tid prn bowel spasms.    ergocalciferol (Vitamin D-2) 1.25 MG (09387 UT) capsule 1 capsule, Once Weekly    famotidine (PEPCID) 20 mg, 2 times daily    ferrous sulfate 325 mg, 2 times daily    gabapentin (NEURONTIN) 200 mg, oral, Nightly    ketoconazole (NIZOral) 2 % shampoo Wash affected areas daily in the shower    lansoprazole (PREVACID) 30 mg, Daily RT    latanoprost (Xalatan) 0.005 % ophthalmic solution 1 drop, Nightly    Levemir FlexPen 10 Units    lidocaine 4 % patch 1 patch, Daily RT    liraglutide (VICTOZA) 0.6 mg, Daily RT    metoprolol succinate XL (Toprol-XL) 100 mg 24 hr tablet     metoprolol succinate XL (TOPROL-XL) 50 mg, Daily    mometasone (Nasonex) 50 mcg/actuation nasal spray Use 2 Sprays in each nostril once daily. For patients  "(12yrs & older)    nateglinide (Starlix) 120 mg tablet 1 tablet, 3 times daily before meals    Renal Caps 1 mg capsule 1 capsule, Daily RT    tamsulosin (FLOMAX) 0.4 mg, 2 times daily    telmisartan (MICARDIS) 80 mg, Daily RT    traZODone (DESYREL) 50 mg, Daily PRN    True Metrix Glucose Test Strip strip 1 Strip three times daily. Use to test sugar 3 times daily.      RX Allergies[4]     Chemistry    No results found for: \"NA\", \"K\", \"CL\", \"CO2\", \"BUN\", \"CREATININE\", \"GLU\" No results found for: \"CALCIUM\", \"ALKPHOS\", \"AST\", \"ALT\", \"BILITOT\"       Lab Results   Component Value Date    HGBA1C 6.0 (H) 11/12/2024     No results found for: \"WBC\", \"HGB\", \"HCT\", \"PLT\"  No results found for: \"PROTIME\", \"PTT\", \"INR\"  No results found for: \"ABORH\"  No results found for this or any previous visit (from the past 4464 hours).  No results found for this or any previous visit from the past 1095 days.     Nuclear stress 8/29/2017@[facility]:  CONCLUSIONS:    1. Left ventricle is normal in size. The left ventricle systolic   function is normal.    2. This is a low risk scan.             Gated Stress FBP    LVEF % 62     Visit Vitals  /63   Pulse 85   Temp 36.6 °C (97.9 °F) (Temporal)   Resp 18   Ht 1.727 m (5' 8\")   Wt 119 kg (262 lb 12.6 oz)   SpO2 97%   BMI 39.96 kg/m²   Smoking Status Never   BSA 2.39 m²     NPO/Void Status  Carbohydrate Drink Given Prior to Surgery? : N  Date of Last Liquid: 04/16/25  Time of Last Liquid: 0400  Date of Last Solid: 04/15/25  Time of Last Solid: 1900  Last Intake Type: Clear fluids  Time of Last Void: 0640        Physical Exam    Airway  Mallampati: III  TM distance: >3 FB  Neck ROM: full  Mouth opening: 3 or more finger widths     Cardiovascular - normal exam  Rhythm: regular  Rate: normal     Dental    Pulmonary - normal exam   Abdominal - normal exam           Anesthesia Plan    History of general anesthesia?: yes  History of complications of general anesthesia?: no    ASA 3     MAC   (Standard " ASA monitoring.)  intravenous induction   Anesthetic plan and risks discussed with patient.    Plan discussed with CRNA and CAA.             [1]   Past Medical History:  Diagnosis Date    BPH (benign prostatic hyperplasia)     Chronic kidney disease     Colon polyp     DM (diabetes mellitus) (Multi)     GERD (gastroesophageal reflux disease)     Glaucoma     Hypertension     Irritable bowel syndrome     Lumbosacral disc disease 2014    PAT (obstructive sleep apnea)    [2]   Past Surgical History:  Procedure Laterality Date    CATARACT EXTRACTION Bilateral     CHOLECYSTECTOMY  1984    COLONOSCOPY      CYST REMOVAL Left     elbow    CYSTOSCOPY      NASAL SINUS SURGERY      TONSILLECTOMY     [3]   Social History  Tobacco Use    Smoking status: Never    Smokeless tobacco: Never   Vaping Use    Vaping status: Never Used   Substance Use Topics    Alcohol use: Not Currently    Drug use: Never   [4]   Allergies  Allergen Reactions    Celebrex [Celecoxib] Shortness of breath    Cefuroxime Diarrhea    Iodine Other     ALEC due to MILEY    Augmentin [Amoxicillin-Pot Clavulanate] GI Upset

## 2025-04-16 NOTE — DISCHARGE INSTRUCTIONS

## 2025-04-17 ENCOUNTER — HOSPITAL ENCOUNTER (OUTPATIENT)
Dept: RADIOLOGY | Facility: EXTERNAL LOCATION | Age: 70
Discharge: HOME | End: 2025-04-17

## 2025-04-17 ASSESSMENT — PAIN SCALES - GENERAL: PAINLEVEL_OUTOF10: 0 - NO PAIN

## 2025-04-30 LAB
LAB AP ASR DISCLAIMER: NORMAL
LABORATORY COMMENT REPORT: NORMAL
PATH REPORT.COMMENTS IMP SPEC: NORMAL
PATH REPORT.FINAL DX SPEC: NORMAL
PATH REPORT.GROSS SPEC: NORMAL
PATH REPORT.TOTAL CANCER: NORMAL

## 2025-08-28 DIAGNOSIS — R10.9 ABDOMINAL PAIN, UNSPECIFIED ABDOMINAL LOCATION: Primary | ICD-10-CM

## 2025-08-28 RX ORDER — DICYCLOMINE HYDROCHLORIDE 20 MG/1
20 TABLET ORAL 4 TIMES DAILY PRN
Qty: 360 TABLET | Refills: 0 | Status: SHIPPED | OUTPATIENT
Start: 2025-08-28

## 2025-09-22 ENCOUNTER — APPOINTMENT (OUTPATIENT)
Facility: CLINIC | Age: 70
End: 2025-09-22
Payer: MEDICARE